# Patient Record
Sex: MALE | Race: WHITE | NOT HISPANIC OR LATINO | Employment: FULL TIME | URBAN - METROPOLITAN AREA
[De-identification: names, ages, dates, MRNs, and addresses within clinical notes are randomized per-mention and may not be internally consistent; named-entity substitution may affect disease eponyms.]

---

## 2017-01-26 ENCOUNTER — DOCUMENTATION ONLY (OUTPATIENT)
Dept: LAB | Facility: CLINIC | Age: 47
End: 2017-01-26

## 2017-01-26 DIAGNOSIS — R53.83 OTHER FATIGUE: Primary | ICD-10-CM

## 2017-01-26 NOTE — PROGRESS NOTES
Patient is coming in for labs on 2/2/17. Physical labs have already been put as future, however, patient is also requesting to have testosterone checked at the same time. If testing is needed, please place order as future.    Thank you.

## 2017-02-02 DIAGNOSIS — Z00.00 ENCOUNTER FOR ROUTINE ADULT HEALTH EXAMINATION WITHOUT ABNORMAL FINDINGS: ICD-10-CM

## 2017-02-02 DIAGNOSIS — E78.5 HYPERLIPIDEMIA LDL GOAL <160: ICD-10-CM

## 2017-02-02 DIAGNOSIS — R53.83 OTHER FATIGUE: ICD-10-CM

## 2017-02-02 LAB
ALBUMIN SERPL-MCNC: 4.3 G/DL (ref 3.4–5)
ALP SERPL-CCNC: 72 U/L (ref 40–150)
ALT SERPL W P-5'-P-CCNC: 47 U/L (ref 0–70)
ANION GAP SERPL CALCULATED.3IONS-SCNC: 7 MMOL/L (ref 3–14)
AST SERPL W P-5'-P-CCNC: 18 U/L (ref 0–45)
BILIRUB SERPL-MCNC: 0.7 MG/DL (ref 0.2–1.3)
BUN SERPL-MCNC: 13 MG/DL (ref 7–30)
CALCIUM SERPL-MCNC: 9.4 MG/DL (ref 8.5–10.1)
CHLORIDE SERPL-SCNC: 103 MMOL/L (ref 94–109)
CHOLEST SERPL-MCNC: 215 MG/DL
CO2 SERPL-SCNC: 29 MMOL/L (ref 20–32)
CREAT SERPL-MCNC: 0.93 MG/DL (ref 0.66–1.25)
ERYTHROCYTE [DISTWIDTH] IN BLOOD BY AUTOMATED COUNT: 12.8 % (ref 10–15)
GFR SERPL CREATININE-BSD FRML MDRD: 88 ML/MIN/1.7M2
GLUCOSE SERPL-MCNC: 104 MG/DL (ref 70–99)
HCT VFR BLD AUTO: 44.9 % (ref 40–53)
HDLC SERPL-MCNC: 56 MG/DL
HGB BLD-MCNC: 15.5 G/DL (ref 13.3–17.7)
LDLC SERPL CALC-MCNC: 130 MG/DL
MCH RBC QN AUTO: 29.1 PG (ref 26.5–33)
MCHC RBC AUTO-ENTMCNC: 34.5 G/DL (ref 31.5–36.5)
MCV RBC AUTO: 84 FL (ref 78–100)
NONHDLC SERPL-MCNC: 159 MG/DL
PLATELET # BLD AUTO: 158 10E9/L (ref 150–450)
POTASSIUM SERPL-SCNC: 4.1 MMOL/L (ref 3.4–5.3)
PROT SERPL-MCNC: 7.3 G/DL (ref 6.8–8.8)
RBC # BLD AUTO: 5.33 10E12/L (ref 4.4–5.9)
SODIUM SERPL-SCNC: 139 MMOL/L (ref 133–144)
TRIGL SERPL-MCNC: 146 MG/DL
WBC # BLD AUTO: 5.8 10E9/L (ref 4–11)

## 2017-02-02 PROCEDURE — 80053 COMPREHEN METABOLIC PANEL: CPT | Performed by: PHYSICIAN ASSISTANT

## 2017-02-02 PROCEDURE — 85027 COMPLETE CBC AUTOMATED: CPT | Performed by: PHYSICIAN ASSISTANT

## 2017-02-02 PROCEDURE — 84403 ASSAY OF TOTAL TESTOSTERONE: CPT | Performed by: PHYSICIAN ASSISTANT

## 2017-02-02 PROCEDURE — 80061 LIPID PANEL: CPT | Performed by: PHYSICIAN ASSISTANT

## 2017-02-02 PROCEDURE — 36415 COLL VENOUS BLD VENIPUNCTURE: CPT | Performed by: PHYSICIAN ASSISTANT

## 2017-02-04 LAB — TESTOST SERPL-MCNC: 456 NG/DL (ref 240–950)

## 2017-02-09 ENCOUNTER — OFFICE VISIT (OUTPATIENT)
Dept: FAMILY MEDICINE | Facility: CLINIC | Age: 47
End: 2017-02-09
Payer: COMMERCIAL

## 2017-02-09 VITALS
RESPIRATION RATE: 16 BRPM | DIASTOLIC BLOOD PRESSURE: 84 MMHG | HEIGHT: 75 IN | HEART RATE: 93 BPM | BODY MASS INDEX: 28.97 KG/M2 | WEIGHT: 233 LBS | OXYGEN SATURATION: 100 % | SYSTOLIC BLOOD PRESSURE: 110 MMHG | TEMPERATURE: 97.1 F

## 2017-02-09 DIAGNOSIS — J45.30 MILD PERSISTENT ASTHMA WITHOUT COMPLICATION: ICD-10-CM

## 2017-02-09 DIAGNOSIS — J30.1 ALLERGIC RHINITIS DUE TO POLLEN, UNSPECIFIED RHINITIS SEASONALITY: ICD-10-CM

## 2017-02-09 DIAGNOSIS — Z23 NEED FOR PROPHYLACTIC VACCINATION AND INOCULATION AGAINST INFLUENZA: ICD-10-CM

## 2017-02-09 DIAGNOSIS — Z00.00 ROUTINE ADULT HEALTH MAINTENANCE: Primary | ICD-10-CM

## 2017-02-09 DIAGNOSIS — J31.0 CHRONIC RHINITIS: ICD-10-CM

## 2017-02-09 DIAGNOSIS — G25.81 RESTLESS LEGS SYNDROME (RLS): ICD-10-CM

## 2017-02-09 DIAGNOSIS — I83.93 VARICOSE VEINS OF BOTH LOWER EXTREMITIES: ICD-10-CM

## 2017-02-09 PROCEDURE — 99396 PREV VISIT EST AGE 40-64: CPT | Mod: 25 | Performed by: PHYSICIAN ASSISTANT

## 2017-02-09 PROCEDURE — 90686 IIV4 VACC NO PRSV 0.5 ML IM: CPT | Performed by: PHYSICIAN ASSISTANT

## 2017-02-09 PROCEDURE — 90471 IMMUNIZATION ADMIN: CPT | Performed by: PHYSICIAN ASSISTANT

## 2017-02-09 RX ORDER — FLUTICASONE PROPIONATE 50 MCG
2 SPRAY, SUSPENSION (ML) NASAL DAILY
Qty: 16 G | Refills: 3 | Status: SHIPPED | OUTPATIENT
Start: 2017-02-09 | End: 2018-03-15

## 2017-02-09 RX ORDER — ALBUTEROL SULFATE 90 UG/1
2 AEROSOL, METERED RESPIRATORY (INHALATION) EVERY 6 HOURS PRN
Qty: 3 INHALER | Refills: 3 | Status: SHIPPED | OUTPATIENT
Start: 2017-02-09 | End: 2019-03-05

## 2017-02-09 RX ORDER — ROPINIROLE 0.5 MG/1
0.5 TABLET, FILM COATED ORAL AT BEDTIME
Qty: 30 TABLET | Refills: 3 | Status: SHIPPED | OUTPATIENT
Start: 2017-02-09 | End: 2018-03-15

## 2017-02-09 NOTE — NURSING NOTE
Injectable Influenza Immunization Documentation    1.  Is the person to be vaccinated sick today?  No    2. Does the person to be vaccinated have an allergy to eggs or to a component of the vaccine?  No    3. Has the person to be vaccinated today ever had a serious reaction to influenza vaccine in the past?  No    4. Has the person to be vaccinated ever had Guillain-[place]enspark syndrome?  No     Form completed verbally with patient. Maricarmen GOYAL MA

## 2017-02-09 NOTE — PROGRESS NOTES
"  SUBJECTIVE:     CC: Bobby Alexandre is an 46 year old male who presents for preventative health visit.     He has lost weight working out and watching his diet.  Has asthma which is well controlled and states he \"never\" needs to use his rescue inhaler  Has sx of RLS more freq lately and would like to try something like this.  Has some VV bilat LE. Denies any pain or swelling in the legs but doesn't like these.  Travels freq for work.    Healthy Habits:    Do you get at least three servings of calcium containing foods daily (dairy, green leafy vegetables, etc.)? yes    Amount of exercise or daily activities, outside of work: 304 day(s) per week    Problems taking medications regularly No    Medication side effects: No    Have you had an eye exam in the past two years? yes    Do you see a dentist twice per year? yes    Do you have sleep apnea, excessive snoring or daytime drowsiness?no      Today's PHQ-2 Score:   PHQ-2 ( 1999 Pfizer) 2/18/2016 1/27/2016   Q1: Little interest or pleasure in doing things 0 0   Q2: Feeling down, depressed or hopeless 0 0   PHQ-2 Score 0 0         Social History   Substance Use Topics     Smoking status: Never Smoker      Smokeless tobacco: Never Used     Alcohol Use: 0.0 oz/week     0 Standard drinks or equivalent per week      Comment: 1 drink per month         Last PSA: No results found for: PSA    Recent Labs   Lab Test  02/02/17   0813  09/29/14   0841  10/11/13   0810   CHOL  215*  220*  231*   HDL  56  55  51   LDL  130*  116  146*   TRIG  146  247*  168*   CHOLHDLRATIO   --   4.0  4.5   NHDL  159*   --    --      All Histories reviewed and updated in Epic.  Past Medical History   Diagnosis Date     Mild persistent asthma      Basal cell carcinoma, scalp/neck 2015     s/p mohs      Past Surgical History   Procedure Laterality Date     Tonsillectomy         ROS:  C: NEGATIVE for fever, chills, change in weight  I: NEGATIVE for worrisome rashes, moles or lesions  E: NEGATIVE " "for vision changes or irritation  ENT: NEGATIVE for ear, mouth and throat problems  R: NEGATIVE for significant cough or SOB  CV: NEGATIVE for chest pain, palpitations or peripheral edema  GI: NEGATIVE for nausea, abdominal pain, heartburn, or change in bowel habits   male: negative for dysuria, hematuria, decreased urinary stream, erectile dysfunction, urethral discharge  M: NEGATIVE for significant arthralgias or myalgia  N: NEGATIVE for weakness, dizziness or paresthesias  P: NEGATIVE for changes in mood or affect      OBJECTIVE:     /84 mmHg  Pulse 93  Temp(Src) 97.1  F (36.2  C) (Oral)  Resp 16  Ht 6' 3\" (1.905 m)  Wt 233 lb (105.688 kg)  BMI 29.12 kg/m2  SpO2 100%  EXAM:  GENERAL: healthy, alert and no distress  RESP: lungs clear to auscultation - no rales, rhonchi or wheezes  CV: regular rate and rhythm, normal S1 S2, no S3 or S4, no murmur, click or rub, no peripheral edema and peripheral pulses strong  SKIN: no suspicious lesions or rashes  NEURO: Normal strength and tone, mentation intact and speech normal  PSYCH: mentation appears normal, affect normal/bright  LE: bilat post calf VV, no edema, palp cord or erythema    Results for orders placed or performed in visit on 02/02/17   CBC with platelets   Result Value Ref Range    WBC 5.8 4.0 - 11.0 10e9/L    RBC Count 5.33 4.4 - 5.9 10e12/L    Hemoglobin 15.5 13.3 - 17.7 g/dL    Hematocrit 44.9 40.0 - 53.0 %    MCV 84 78 - 100 fl    MCH 29.1 26.5 - 33.0 pg    MCHC 34.5 31.5 - 36.5 g/dL    RDW 12.8 10.0 - 15.0 %    Platelet Count 158 150 - 450 10e9/L   Comprehensive metabolic panel   Result Value Ref Range    Sodium 139 133 - 144 mmol/L    Potassium 4.1 3.4 - 5.3 mmol/L    Chloride 103 94 - 109 mmol/L    Carbon Dioxide 29 20 - 32 mmol/L    Anion Gap 7 3 - 14 mmol/L    Glucose 104 (H) 70 - 99 mg/dL    Urea Nitrogen 13 7 - 30 mg/dL    Creatinine 0.93 0.66 - 1.25 mg/dL    GFR Estimate 88 >60 mL/min/1.7m2    GFR Estimate If Black >90  African " American GFR Calc   >60 mL/min/1.7m2    Calcium 9.4 8.5 - 10.1 mg/dL    Bilirubin Total 0.7 0.2 - 1.3 mg/dL    Albumin 4.3 3.4 - 5.0 g/dL    Protein Total 7.3 6.8 - 8.8 g/dL    Alkaline Phosphatase 72 40 - 150 U/L    ALT 47 0 - 70 U/L    AST 18 0 - 45 U/L   Lipid Profile   Result Value Ref Range    Cholesterol 215 (H) <200 mg/dL    Triglycerides 146 <150 mg/dL    HDL Cholesterol 56 >39 mg/dL    LDL Cholesterol Calculated 130 (H) <100 mg/dL    Non HDL Cholesterol 159 (H) <130 mg/dL   Testosterone, total   Result Value Ref Range    Testosterone Total 456 240 - 950 ng/dL         ASSESSMENT/PLAN:     Assessment and Plan:     (Z00.00) Routine adult health maintenance  (primary encounter diagnosis)  Comment: previsit labs reviewed with pt  Plan: medications refilled, flu vaccine given    (J45.30) Mild persistent asthma without complication  Comment:   Plan: mometasone (ASMANEX 60 METERED DOSES) 220         MCG/INH Inhaler, salmeterol (SEREVENT DISKUS)         50 MCG/DOSE diskus inhaler, albuterol (PROAIR         HFA/PROVENTIL HFA/VENTOLIN HFA) 108 (90 BASE)         MCG/ACT Inhaler        Refilled. Asthma well controlled    (J30.1) Allergic rhinitis due to pollen, unspecified rhinitis seasonality  Comment:   Plan: fluticasone (FLONASE) 50 MCG/ACT spray            (J31.0) Chronic rhinitis  Comment: uses claritin D prn and would rx  Plan: loratadine-pseudoePHEDrine (CLARITIN-D 24-HOUR)         MG per 24 hr tablet            (G25.81) Restless legs syndrome (RLS)  Comment:   Plan: rOPINIRole (REQUIP) 0.5 MG tablet        Try 1-2 tabs po qhs    (I83.93) Varicose veins of both lower extremities  Comment:   Plan: DME REFERRAL        jobst stockings tete for travel    (Z23) Need for prophylactic vaccination and inoculation against influenza  Comment:   Plan: FLU VAC, SPLIT VIRUS IM > 3 YO (QUADRIVALENT)         [07026], Vaccine Administration, Initial         [31747]                  COUNSELING:  Reviewed preventive health  "counseling, as reflected in patient instructions         reports that he has never smoked. He has never used smokeless tobacco.    Estimated body mass index is 29.12 kg/(m^2) as calculated from the following:    Height as of this encounter: 6' 3\" (1.905 m).    Weight as of this encounter: 233 lb (105.688 kg).   Weight management plan: discussed    Counseling Resources:  ATP IV Guidelines  Pooled Cohorts Equation Calculator  FRAX Risk Assessment  ICSI Preventive Guidelines  Dietary Guidelines for Americans, 2010  USDA's MyPlate  ASA Prophylaxis  Lung CA Screening    Silvina Harvey PA-C  [facility]  "

## 2017-02-09 NOTE — NURSING NOTE
"Chief Complaint   Patient presents with     Physical       Initial /84 mmHg  Pulse 93  Temp(Src) 97.1  F (36.2  C) (Oral)  Resp 16  Ht 6' 3\" (1.905 m)  Wt 233 lb (105.688 kg)  BMI 29.12 kg/m2  SpO2 100% Estimated body mass index is 29.12 kg/(m^2) as calculated from the following:    Height as of this encounter: 6' 3\" (1.905 m).    Weight as of this encounter: 233 lb (105.688 kg).  Medication Reconciliation: complete   donnie duque    "

## 2017-02-09 NOTE — MR AVS SNAPSHOT
After Visit Summary   2/9/2017    Bobby Alexandre    MRN: [id number]           Patient Information     Date Of Birth          1970        Visit Information        Provider Department      2/9/2017 4:00 PM Silvina Harvey PA-C [facility]        Today's Diagnoses     Mild persistent asthma without complication    -  1     Allergic rhinitis due to pollen, unspecified rhinitis seasonality         Chronic rhinitis         Restless legs syndrome (RLS)         Varicose veins of both lower extremities           Care Instructions      Preventive Health Recommendations  Male Ages 40 to 49    Yearly exam:             See your health care provider every year in order to  o   Review health changes.   o   Discuss preventive care.    o   Review your medicines if your doctor has prescribed any.    You should be tested each year for STDs (sexually transmitted diseases) if you re at risk.     Have a cholesterol test every 5 years.     Have a colonoscopy (test for colon cancer) if someone in your family has had colon cancer or polyps before age 50.     After age 45, have a diabetes test (fasting glucose). If you are at risk for diabetes, you should have this test every 3 years.      Talk with your health care provider about whether or not a prostate cancer screening test (PSA) is right for you.    Shots: Get a flu shot each year. Get a tetanus shot every 10 years.     Nutrition:    Eat at least 5 servings of fruits and vegetables daily.     Eat whole-grain bread, whole-wheat pasta and brown rice instead of white grains and rice.     Talk to your provider about Calcium and Vitamin D.     Lifestyle    Exercise for at least 150 minutes a week (30 minutes a day, 5 days a week). This will help you control your weight and prevent disease.     Limit alcohol to one drink per day.     No smoking.     Wear sunscreen to prevent skin cancer.     See your dentist every six months for an exam and cleaning.     "        Follow-ups after your visit        Additional Services     DME REFERRAL       Please be aware that coverage of these services is subject to the terms and limitations of your health insurance plan.  Call member services at your health plan with any benefit or coverage questions.      Dispense 2 pair medium compression knee high jobst stockings  Dx: bilat LE Varicose veins    Please bring the following to your appointment:  Any x-rays, CTs or MRIs which have been performed.  Contact the facility where they were done to arrange for  prior to your scheduled appointment.    List of current medications   This referral request   Any documents/labs given to you for this referral                  Who to contact     If you have questions or need follow up information about today's clinic visit or your schedule please contact [facility] directly at [phone].  Normal or non-critical lab and imaging results will be communicated to you by MyChart, letter or phone within 4 business days after the clinic has received the results. If you do not hear from us within 7 days, please contact the clinic through MyChart or phone. If you have a critical or abnormal lab result, we will notify you by phone as soon as possible.  Submit refill requests through [employer] or call your pharmacy and they will forward the refill request to us. Please allow 3 business days for your refill to be completed.          Additional Information About Your Visit        [employer] Information     [employer] lets you send messages to your doctor, view your test results, renew your prescriptions, schedule appointments and more. To sign up, go to [email or web address] . Click on \"Log in\" on the left side of the screen, which will take you to the Welcome page. Then click on \"Sign up Now\" on the right side of the page.     You will be asked to enter the access code listed below, as well as some personal information. Please follow the " "directions to create your username and password.     Your access code is: [email or web address]  Expires: 5/10/2017  4:47 PM     Your access code will  in 90 days. If you need help or a new code, please call your [facility] clinic or [phone].        Care EveryWhere ID     This is your Care EveryWhere ID. This could be used by other organizations to access your [facility] medical records  [id number]        Your Vitals Were     Pulse Temperature Respirations Height BMI (Body Mass Index) Pulse Oximetry    93 97.1  F (36.2  C) (Oral) 16 6' 3\" (1.905 m) 29.12 kg/m2 100%       Blood Pressure from Last 3 Encounters:   17 110/84   16 125/84   16 141/92    Weight from Last 3 Encounters:   17 233 lb (105.688 kg)   16 245 lb 4.8 oz (111.267 kg)   16 243 lb (110.224 kg)              We Performed the Following     DME REFERRAL          Today's Medication Changes          These changes are accurate as of: 17  4:47 PM.  If you have any questions, ask your nurse or doctor.               Start taking these medicines.        Dose/Directions    rOPINIRole 0.5 MG tablet   Commonly known as:  REQUIP   Used for:  Restless legs syndrome (RLS)   Started by:  Silvina Harvey PA-C        Dose:  0.5 mg   Take 1 tablet (0.5 mg) by mouth At Bedtime   Quantity:  30 tablet   Refills:  3         These medicines have changed or have updated prescriptions.        Dose/Directions    mometasone 220 MCG/INH Inhaler   Commonly known as:  ASMANEX 60 METERED DOSES   This may have changed:  See the new instructions.   Used for:  Mild persistent asthma without complication   Changed by:  Silvina Harvey PA-C        INHALE 1 PUFF INTO THE LUNGS TWICE DAILY   Quantity:  3 Inhaler   Refills:  3       salmeterol 50 MCG/DOSE diskus inhaler   Commonly known as:  SEREVENT DISKUS   This may have changed:  See the new instructions.   Used for:  Mild persistent asthma without complication   Changed by:  Silvina Harvey" DARRIAN        INHALE 1 PUFF INTO THE LUNGS TWICE DAILY   Quantity:  180 Inhaler   Refills:  3            Where to get your medicines      These medications were sent to [id number] - [place], [place] - [address] AT [address] [place] [address] & [facility]  [address] [place] [address]    Hours:  24-hours Phone:  [phone]    - albuterol 108 (90 BASE) MCG/ACT Inhaler  - fluticasone 50 MCG/ACT spray  - mometasone 220 MCG/INH Inhaler  - rOPINIRole 0.5 MG tablet  - salmeterol 50 MCG/DOSE diskus inhaler      Some of these will need a paper prescription and others can be bought over the counter.  Ask your nurse if you have questions.     Bring a paper prescription for each of these medications    - loratadine-pseudoePHEDrine  MG per 24 hr tablet             Primary Care Provider Office Phone # Fax #    Silvina Kristina Harvey PA-C [phone]       [facility] [address]  [facility] [address]        Thank you!     Thank you for choosing [facility]  for your care. Our goal is always to provide you with excellent care. Hearing back from our patients is one way we can continue to improve our services. Please take a few minutes to complete the written survey that you may receive in the mail after your visit with us. Thank you!             Your Updated Medication List - Protect others around you: Learn how to safely use, store and throw away your medicines at www.disposemymeds.org.          This list is accurate as of: 2/9/17  4:47 PM.  Always use your most recent med list.                   Brand Name Dispense Instructions for use    albuterol 108 (90 BASE) MCG/ACT Inhaler    PROAIR HFA/PROVENTIL HFA/VENTOLIN HFA    3 Inhaler    Inhale 2 puffs into the lungs every 6 hours as needed for shortness of breath / dyspnea       fluticasone 50 MCG/ACT spray    FLONASE    16 g    Spray 2 sprays into both nostrils daily       loratadine-pseudoePHEDrine  MG per 24 hr tablet     CLARITIN-D 24-hour    30 tablet    Take 1 tablet by mouth daily as needed       mometasone 220 MCG/INH Inhaler    ASMANEX 60 METERED DOSES    3 Inhaler    INHALE 1 PUFF INTO THE LUNGS TWICE DAILY       rOPINIRole 0.5 MG tablet    REQUIP    30 tablet    Take 1 tablet (0.5 mg) by mouth At Bedtime       salmeterol 50 MCG/DOSE diskus inhaler    SEREVENT DISKUS    180 Inhaler    INHALE 1 PUFF INTO THE LUNGS TWICE DAILY       triamcinolone 0.1 % cream    KENALOG    30 g    Apply sparingly to affected area three times daily for 14 days.       vitamin D 2000 UNITS tablet      Take 1 tablet by mouth daily

## 2017-02-10 ASSESSMENT — ASTHMA QUESTIONNAIRES: ACT_TOTALSCORE: 25

## 2017-07-24 DIAGNOSIS — J45.30 MILD PERSISTENT ASTHMA: ICD-10-CM

## 2017-07-25 NOTE — TELEPHONE ENCOUNTER
Has patient really gone thru 12 inhalers since Feb 2017?        On 2-9-17 we ok'ed 3 inhalers with 3 adiditonal refills at [facility] [address] .     Last office visit 2/2017       mometasone (ASMANEX 60 METERED DOSES) 220 MCG/INH Inhaler 3 Inhaler 3 2/9/2017  No   Sig: INHALE 1 PUFF INTO THE LUNGS TWICE DAILY     Associated Diagnoses   Mild persistent asthma without complication [J45.30]           Date of Last Asthma Action Plan Letter:   Asthma Action Plan Q1 Year    Topic Date Due     Asthma Action Plan - yearly  12/16/2016      Asthma Control Test:   ACT Total Scores 2/9/2017   ACT TOTAL SCORE -   ASTHMA ER VISITS -   ASTHMA HOSPITALIZATIONS -   ACT TOTAL SCORE (Goal Greater than or Equal to 20) 25   In the past 12 months, how many times did you visit the emergency room for your asthma without being admitted to the hospital? 0   In the past 12 months, how many times were you hospitalized overnight because of your asthma? 0       Date of Last Spirometry Test:   No results found for this or any previous visit.

## 2017-07-26 DIAGNOSIS — J45.30 MILD PERSISTENT ASTHMA WITHOUT COMPLICATION: ICD-10-CM

## 2017-07-27 NOTE — TELEPHONE ENCOUNTER
Spoke with Nancy - states they received an Rx 7/27/17 and to disregard request  Fatoumata BUTLER RN

## 2018-01-05 ENCOUNTER — OFFICE VISIT (OUTPATIENT)
Dept: URGENT CARE | Facility: URGENT CARE | Age: 48
End: 2018-01-05
Payer: COMMERCIAL

## 2018-01-05 VITALS
DIASTOLIC BLOOD PRESSURE: 93 MMHG | RESPIRATION RATE: 17 BRPM | OXYGEN SATURATION: 97 % | SYSTOLIC BLOOD PRESSURE: 123 MMHG | HEIGHT: 75 IN | BODY MASS INDEX: 30.46 KG/M2 | HEART RATE: 110 BPM | TEMPERATURE: 96.4 F | WEIGHT: 245 LBS

## 2018-01-05 DIAGNOSIS — J98.01 ACUTE BRONCHOSPASM: Primary | ICD-10-CM

## 2018-01-05 DIAGNOSIS — J20.9 ACUTE BRONCHITIS WITH SYMPTOMS > 10 DAYS: ICD-10-CM

## 2018-01-05 PROCEDURE — 99214 OFFICE O/P EST MOD 30 MIN: CPT

## 2018-01-05 RX ORDER — CODEINE PHOSPHATE AND GUAIFENESIN 10; 100 MG/5ML; MG/5ML
2 SOLUTION ORAL EVERY 4 HOURS PRN
Qty: 180 ML | Refills: 0 | Status: SHIPPED | OUTPATIENT
Start: 2018-01-05 | End: 2018-03-15

## 2018-01-05 RX ORDER — DOXYCYCLINE 100 MG/1
100 CAPSULE ORAL 2 TIMES DAILY
Qty: 20 CAPSULE | Refills: 0 | Status: SHIPPED | OUTPATIENT
Start: 2018-01-05 | End: 2018-03-15

## 2018-01-05 RX ORDER — ALBUTEROL SULFATE 0.83 MG/ML
1 SOLUTION RESPIRATORY (INHALATION) ONCE
Qty: 3 ML | Refills: 0 | Status: SHIPPED | OUTPATIENT
Start: 2018-01-05 | End: 2018-12-18

## 2018-01-05 RX ORDER — PREDNISONE 20 MG/1
40 TABLET ORAL DAILY
Qty: 10 TABLET | Refills: 0 | Status: SHIPPED | OUTPATIENT
Start: 2018-01-05 | End: 2018-01-10

## 2018-01-05 ASSESSMENT — ENCOUNTER SYMPTOMS
COUGH: 1
MUSCULOSKELETAL NEGATIVE: 1
WHEEZING: 1
SPUTUM PRODUCTION: 1
NEUROLOGICAL NEGATIVE: 1
CARDIOVASCULAR NEGATIVE: 1
FEVER: 0
EYES NEGATIVE: 1
GASTROINTESTINAL NEGATIVE: 1
CHILLS: 1

## 2018-01-05 NOTE — PATIENT INSTRUCTIONS
Bronchitis with Wheezing (Viral or Bacterial: Adult)    Bronchitis is an infection of the air passages. It often occurs during a cold and is usually caused by a virus. Symptoms include cough with mucus (phlegm) and low-grade fever. This illness is contagious during the first few days and is spread through the air by coughing and sneezing, or by direct contact (touching the sick person and then touching your own eyes, nose, or mouth).  If there is a lot of inflammation, air flow is restricted. The air passages may also go into spasm, especially if you have asthma. This causes wheezing and difficulty breathing even in people who do not have asthma.  Bronchitis usually lasts 7 to 14 days. The wheezing should improve with treatment during the first week. An inhaler is often prescribed to relax the air passages and stop wheezing. Antibiotics will be prescribed if your doctor thinks there is also a secondary bacterial infection.  Home care    If symptoms are severe, rest at home for the first 2 to 3 days. When you go back to your usual activities, don't let yourself get too tired.    Do not smoke. Also avoid being exposed to secondhand smoke.    You may use over-the-counter medicine to control fever or pain, unless another medicine was prescribed. Note: If you have chronic liver or kidney disease or have ever had a stomach ulcer or gastrointestinal bleeding, talk with your healthcare provider before using these medicines. Also talk to your provider if you are taking medicine to prevent blood clots.) Aspirin should never be given to anyone younger than 18 years of age who is ill with a viral infection or fever. It may cause severe liver or brain damage.    Your appetite may be poor, so a light diet is fine. Avoid dehydration by drinking 6 to 8 glasses of fluids per day (such as water, soft drinks, sports drinks, juices, tea, or soup). Extra fluids will help loosen secretions in the nose and lungs.    Over-the-counter  cough, cold, and sore-throat medicines will not shorten the length of the illness, but they may be helpful to reduce symptoms. (Note: Do not use decongestants if you have high blood pressure.)    If you were given an inhaler, use it exactly as directed. If you need to use it more often than prescribed, your condition may be worsening. If this happens, contact your healthcare provider.    If prescribed, finish all antibiotic medicine, even if you are feeling better after only a few days.  Follow-up care  Follow up with your healthcare provider, or as advised. If you had an X-ray or ECG (electrocardiogram), a specialist will review it. You will be notified of any new findings that may affect your care.  Note: If you are age 65 or older, or if you have a chronic lung disease or condition that affects your immune system, or you smoke, talk to your healthcare provider about having a pneumococcal vaccinations and a yearly influenza vaccination (flu shot).  When to seek medical advice  Call your healthcare provider right away if any of these occur:    Fever of 100.4 F (38 C) or higher    Coughing up increasing amounts of colored sputum    Weakness, drowsiness, headache, facial pain, ear pain, or a stiff neck  Call 911, or get immediate medical care  Contact emergency services right away if any of these occur.    Coughing up blood    Worsening weakness, drowsiness, headache, or stiff neck    Increased wheezing not helped with medication, shortness of breath, or pain with breathing  Date Last Reviewed: 9/13/2015 2000-2017 [employer]. [address] [place] [address]. All rights reserved. This information is not intended as a substitute for professional medical care. Always follow your healthcare professional's instructions.        Bronchitis, Antibiotic Treatment (Adult)    Bronchitis is an infection of the air passages (bronchial tubes) in your lungs. It often occurs when you have a cold. This  illness is contagious during the first few days and is spread through the air by coughing and sneezing, or by direct contact (touching the sick person and then touching your own eyes, nose, or mouth).  Symptoms of bronchitis include cough with mucus (phlegm) and low-grade fever. Bronchitis usually lasts 7 to 14 days. Mild cases can be treated with simple home remedies. More severe infection is treated with an antibiotic.  Home care  Follow these guidelines when caring for yourself at home:    If your symptoms are severe, rest at home for the first 2 to 3 days. When you go back to your usual activities, don't let yourself get too tired.    Do not smoke. Also avoid being exposed to secondhand smoke.    You may use over-the-counter medicines to control fever or pain, unless another medicine was prescribed. (Note: If you have chronic liver or kidney disease or have ever had a stomach ulcer or gastrointestinal bleeding, talk with your healthcare provider before using these medicines. Also talk to your provider if you are taking medicine to prevent blood clots.) Aspirin should never be given to anyone younger than 18 years of age who is ill with a viral infection or fever. It may cause severe liver or brain damage.    Your appetite may be poor, so a light diet is fine. Avoid dehydration by drinking 6 to 8 glasses of fluids per day (such as water, soft drinks, sports drinks, juices, tea, or soup). Extra fluids will help loosen secretions in the nose and lungs.    Over-the-counter cough, cold, and sore-throat medicines will not shorten the length of the illness, but they may be helpful to reduce symptoms. (Note: Do not use decongestants if you have high blood pressure.)    Finish all antibiotic medicine. Do this even if you are feeling better after only a few days.  Follow-up care  Follow up with your healthcare provider, or as advised. If you had an X-ray or ECG (electrocardiogram), a specialist will review it. You will be  notified of any new findings that may affect your care.  Note: If you are age 65 or older, or if you have a chronic lung disease or condition that affects your immune system, or you smoke, talk to your healthcare provider about having pneumococcal vaccinations and a yearly influenza vaccination (flu shot).  When to seek medical advice  Call your healthcare provider right away if any of these occur:    Fever of 100.4 F (38 C) or higher    Coughing up increased amounts of colored sputum    Weakness, drowsiness, headache, facial pain, ear pain, or a stiff neck  Call 911, or get immediate medical care  Contact emergency services right away if any of these occur.    Coughing up blood    Worsening weakness, drowsiness, headache, or stiff neck    Trouble breathing, wheezing, or pain with breathing  Date Last Reviewed: 9/13/2015 2000-2017 [employer]. [address] [place] [address]. All rights reserved. This information is not intended as a substitute for professional medical care. Always follow your healthcare professional's instructions.

## 2018-01-05 NOTE — MR AVS SNAPSHOT
After Visit Summary   1/5/2018    Bobby Alexandre    MRN: [id number]           Patient Information     Date Of Birth          1970        Visit Information        Provider Department      1/5/2018 5:00 PM  URGENT CARE [facility]        Today's Diagnoses     Acute bronchospasm    -  1    Acute bronchitis with symptoms > 10 days          Care Instructions      Bronchitis with Wheezing (Viral or Bacterial: Adult)    Bronchitis is an infection of the air passages. It often occurs during a cold and is usually caused by a virus. Symptoms include cough with mucus (phlegm) and low-grade fever. This illness is contagious during the first few days and is spread through the air by coughing and sneezing, or by direct contact (touching the sick person and then touching your own eyes, nose, or mouth).  If there is a lot of inflammation, air flow is restricted. The air passages may also go into spasm, especially if you have asthma. This causes wheezing and difficulty breathing even in people who do not have asthma.  Bronchitis usually lasts 7 to 14 days. The wheezing should improve with treatment during the first week. An inhaler is often prescribed to relax the air passages and stop wheezing. Antibiotics will be prescribed if your doctor thinks there is also a secondary bacterial infection.  Home care    If symptoms are severe, rest at home for the first 2 to 3 days. When you go back to your usual activities, don't let yourself get too tired.    Do not smoke. Also avoid being exposed to secondhand smoke.    You may use over-the-counter medicine to control fever or pain, unless another medicine was prescribed. Note: If you have chronic liver or kidney disease or have ever had a stomach ulcer or gastrointestinal bleeding, talk with your healthcare provider before using these medicines. Also talk to your provider if you are taking medicine to prevent blood clots.) Aspirin should  never be given to anyone younger than 18 years of age who is ill with a viral infection or fever. It may cause severe liver or brain damage.    Your appetite may be poor, so a light diet is fine. Avoid dehydration by drinking 6 to 8 glasses of fluids per day (such as water, soft drinks, sports drinks, juices, tea, or soup). Extra fluids will help loosen secretions in the nose and lungs.    Over-the-counter cough, cold, and sore-throat medicines will not shorten the length of the illness, but they may be helpful to reduce symptoms. (Note: Do not use decongestants if you have high blood pressure.)    If you were given an inhaler, use it exactly as directed. If you need to use it more often than prescribed, your condition may be worsening. If this happens, contact your healthcare provider.    If prescribed, finish all antibiotic medicine, even if you are feeling better after only a few days.  Follow-up care  Follow up with your healthcare provider, or as advised. If you had an X-ray or ECG (electrocardiogram), a specialist will review it. You will be notified of any new findings that may affect your care.  Note: If you are age 65 or older, or if you have a chronic lung disease or condition that affects your immune system, or you smoke, talk to your healthcare provider about having a pneumococcal vaccinations and a yearly influenza vaccination (flu shot).  When to seek medical advice  Call your healthcare provider right away if any of these occur:    Fever of 100.4 F (38 C) or higher    Coughing up increasing amounts of colored sputum    Weakness, drowsiness, headache, facial pain, ear pain, or a stiff neck  Call 911, or get immediate medical care  Contact emergency services right away if any of these occur.    Coughing up blood    Worsening weakness, drowsiness, headache, or stiff neck    Increased wheezing not helped with medication, shortness of breath, or pain with breathing  Date Last Reviewed: 9/13/2015     [id number] [employer]. [address] [place] [address]. All rights reserved. This information is not intended as a substitute for professional medical care. Always follow your healthcare professional's instructions.        Bronchitis, Antibiotic Treatment (Adult)    Bronchitis is an infection of the air passages (bronchial tubes) in your lungs. It often occurs when you have a cold. This illness is contagious during the first few days and is spread through the air by coughing and sneezing, or by direct contact (touching the sick person and then touching your own eyes, nose, or mouth).  Symptoms of bronchitis include cough with mucus (phlegm) and low-grade fever. Bronchitis usually lasts 7 to 14 days. Mild cases can be treated with simple home remedies. More severe infection is treated with an antibiotic.  Home care  Follow these guidelines when caring for yourself at home:    If your symptoms are severe, rest at home for the first 2 to 3 days. When you go back to your usual activities, don't let yourself get too tired.    Do not smoke. Also avoid being exposed to secondhand smoke.    You may use over-the-counter medicines to control fever or pain, unless another medicine was prescribed. (Note: If you have chronic liver or kidney disease or have ever had a stomach ulcer or gastrointestinal bleeding, talk with your healthcare provider before using these medicines. Also talk to your provider if you are taking medicine to prevent blood clots.) Aspirin should never be given to anyone younger than 18 years of age who is ill with a viral infection or fever. It may cause severe liver or brain damage.    Your appetite may be poor, so a light diet is fine. Avoid dehydration by drinking 6 to 8 glasses of fluids per day (such as water, soft drinks, sports drinks, juices, tea, or soup). Extra fluids will help loosen secretions in the nose and lungs.    Over-the-counter cough, cold, and sore-throat  medicines will not shorten the length of the illness, but they may be helpful to reduce symptoms. (Note: Do not use decongestants if you have high blood pressure.)    Finish all antibiotic medicine. Do this even if you are feeling better after only a few days.  Follow-up care  Follow up with your healthcare provider, or as advised. If you had an X-ray or ECG (electrocardiogram), a specialist will review it. You will be notified of any new findings that may affect your care.  Note: If you are age 65 or older, or if you have a chronic lung disease or condition that affects your immune system, or you smoke, talk to your healthcare provider about having pneumococcal vaccinations and a yearly influenza vaccination (flu shot).  When to seek medical advice  Call your healthcare provider right away if any of these occur:    Fever of 100.4 F (38 C) or higher    Coughing up increased amounts of colored sputum    Weakness, drowsiness, headache, facial pain, ear pain, or a stiff neck  Call 911, or get immediate medical care  Contact emergency services right away if any of these occur.    Coughing up blood    Worsening weakness, drowsiness, headache, or stiff neck    Trouble breathing, wheezing, or pain with breathing  Date Last Reviewed: 9/13/2015 2000-2017 [employer]. [address] [place] [address], [place] [address]. All rights reserved. This information is not intended as a substitute for professional medical care. Always follow your healthcare professional's instructions.                Follow-ups after your visit        Who to contact     If you have questions or need follow up information about today's clinic visit or your schedule please contact [facility] directly at [phone].  Normal or non-critical lab and imaging results will be communicated to you by MyChart, letter or phone within 4 business days after the clinic has received the results. If you do not hear from us within 7  "days, please contact the clinic through [employer] or phone. If you have a critical or abnormal lab result, we will notify you by phone as soon as possible.  Submit refill requests through [employer] or call your pharmacy and they will forward the refill request to us. Please allow 3 business days for your refill to be completed.          Additional Information About Your Visit        [employer] Information     [employer] lets you send messages to your doctor, view your test results, renew your prescriptions, schedule appointments and more. To sign up, go to [email or web address] . Click on \"Log in\" on the left side of the screen, which will take you to the Welcome page. Then click on \"Sign up Now\" on the right side of the page.     You will be asked to enter the access code listed below, as well as some personal information. Please follow the directions to create your username and password.     Your access code is: [email or web address]  Expires: 2018  5:40 PM     Your access code will  in 90 days. If you need help or a new code, please call your [place] clinic or [phone].        Care EveryWhere ID     This is your Care EveryWhere ID. This could be used by other organizations to access your [place] medical records  [id number]        Your Vitals Were     Pulse Temperature Respirations Height Pulse Oximetry BMI (Body Mass Index)    110 96.4  F (35.8  C) (Oral) 17 6' 3\" (1.905 m) 97% 30.62 kg/m2       Blood Pressure from Last 3 Encounters:   18 (!) 123/93   17 110/84   16 125/84    Weight from Last 3 Encounters:   18 245 lb (111.1 kg)   17 233 lb (105.7 kg)   16 245 lb 4.8 oz (111.3 kg)              Today, you had the following     No orders found for display         Today's Medication Changes          These changes are accurate as of: 18  5:40 PM.  If you have any questions, ask your nurse or doctor.               Start taking these medicines.        Dose/Directions    " doxycycline 100 MG capsule   Commonly known as:  VIBRAMYCIN   Used for:  Acute bronchitis with symptoms > 10 days        Dose:  100 mg   Take 1 capsule (100 mg) by mouth 2 times daily   Quantity:  20 capsule   Refills:  0       guaiFENesin-codeine 100-10 MG/5ML Soln solution   Commonly known as:  ROBITUSSIN AC   Used for:  Acute bronchitis with symptoms > 10 days        Dose:  2 tsp.   Take 10 mLs by mouth every 4 hours as needed for cough   Quantity:  180 mL   Refills:  0         These medicines have changed or have updated prescriptions.        Dose/Directions    * albuterol 108 (90 BASE) MCG/ACT Inhaler   Commonly known as:  PROAIR HFA/PROVENTIL HFA/VENTOLIN HFA   This may have changed:  Another medication with the same name was added. Make sure you understand how and when to take each.   Used for:  Mild persistent asthma without complication   Changed by:  Silvina Harvey PA-C        Dose:  2 puff   Inhale 2 puffs into the lungs every 6 hours as needed for shortness of breath / dyspnea   Quantity:  3 Inhaler   Refills:  3       * albuterol (2.5 MG/3ML) 0.083% neb solution   This may have changed:  You were already taking a medication with the same name, and this prescription was added. Make sure you understand how and when to take each.   Used for:  Acute bronchospasm        Dose:  1 vial   Take 1 vial (2.5 mg) by nebulization once for 1 dose   Quantity:  3 mL   Refills:  0       * Notice:  This list has 2 medication(s) that are the same as other medications prescribed for you. Read the directions carefully, and ask your doctor or other care provider to review them with you.         Where to get your medicines      These medications were sent to [facility] [id number] [place] [address] [address] [address]    Hours:  24-hours Phone:  [phone]     doxycycline 100 MG capsule         Some of these will need a paper prescription and others can be  bought over the counter.  Ask your nurse if you have questions.     Bring a paper prescription for each of these medications     albuterol (2.5 MG/3ML) 0.083% neb solution    guaiFENesin-codeine 100-10 MG/5ML Soln solution                Primary Care Provider Office Phone # Fax #    Silvina Harvey PA-C [phone] [address]  [facility] [address]        Equal Access to Services     TEN BERNAL : Hadii aad ku hadasho Soomaali, waaxda luqadaha, qaybta kaalmada adeegyada, waxay idiin hayaan adeeg kharash la'yamini . So [facility] [phone].    ATENCIÓN: Si habla español, tiene a mendoza disposición servicios gratuitos de asistencia lingüística. [facility] [phone].    We comply with applicable federal civil rights laws and [place] laws. We do not discriminate on the basis of race, color, national origin, age, disability, sex, sexual orientation, or gender identity.            Thank you!     Thank you for choosing [facility]  for your care. Our goal is always to provide you with excellent care. Hearing back from our patients is one way we can continue to improve our services. Please take a few minutes to complete the written survey that you may receive in the mail after your visit with us. Thank you!             Your Updated Medication List - Protect others around you: Learn how to safely use, store and throw away your medicines at www.disposemymeds.org.          This list is accurate as of: 1/5/18  5:40 PM.  Always use your most recent med list.                   Brand Name Dispense Instructions for use Diagnosis    * albuterol 108 (90 BASE) MCG/ACT Inhaler    PROAIR HFA/PROVENTIL HFA/VENTOLIN HFA    3 Inhaler    Inhale 2 puffs into the lungs every 6 hours as needed for shortness of breath / dyspnea    Mild persistent asthma without complication       * albuterol (2.5 MG/3ML) 0.083% neb solution     3 mL    Take 1 vial (2.5 mg) by nebulization once for 1 dose    Acute  bronchospasm       doxycycline 100 MG capsule    VIBRAMYCIN    20 capsule    Take 1 capsule (100 mg) by mouth 2 times daily    Acute bronchitis with symptoms > 10 days       fluticasone 50 MCG/ACT spray    FLONASE    16 g    Spray 2 sprays into both nostrils daily    Allergic rhinitis due to pollen, unspecified rhinitis seasonality       guaiFENesin-codeine 100-10 MG/5ML Soln solution    ROBITUSSIN AC    180 mL    Take 10 mLs by mouth every 4 hours as needed for cough    Acute bronchitis with symptoms > 10 days       loratadine-pseudoePHEDrine  MG per 24 hr tablet    CLARITIN-D 24-hour    30 tablet    Take 1 tablet by mouth daily as needed    Chronic rhinitis       mometasone 220 MCG/INH Inhaler    ASMANEX 60 METERED DOSES    3 Inhaler    INHALE 1 PUFF INTO THE LUNGS TWICE DAILY    Mild persistent asthma without complication       rOPINIRole 0.5 MG tablet    REQUIP    30 tablet    Take 1 tablet (0.5 mg) by mouth At Bedtime    Restless legs syndrome (RLS)       salmeterol 50 MCG/DOSE diskus inhaler    SEREVENT DISKUS    180 Inhaler    INHALE 1 PUFF INTO THE LUNGS TWICE DAILY    Mild persistent asthma without complication       triamcinolone 0.1 % cream    KENALOG    30 g    Apply sparingly to affected area three times daily for 14 days.    Tinea cruris       vitamin D 2000 UNITS tablet      Take 1 tablet by mouth daily        * Notice:  This list has 2 medication(s) that are the same as other medications prescribed for you. Read the directions carefully, and ask your doctor or other care provider to review them with you.

## 2018-01-06 NOTE — PROGRESS NOTES
HPI  Bobby Alexandre 47 year old presents with a URI for 4 weeks and a change in cough and chest congestion over the past 4-5 days. Cough has become productive, painful and he notes a bit of wheezing which is unusual as his asthma symptoms have been well controlled. Negative N/V/D/F/C chest pain or SOB.     Past Medical History:   Diagnosis Date     Basal cell carcinoma, scalp/neck 2015    s/p mohs     Mild persistent asthma      Past Surgical History:   Procedure Laterality Date     TONSILLECTOMY       Patient Active Problem List   Diagnosis     Mild persistent asthma     Hyperlipidemia LDL goal <160     Basal cell carcinoma, scalp/neck     Restless legs syndrome (RLS)     Allergies   Allergen Reactions     No Known Allergies      Current Outpatient Prescriptions   Medication     albuterol (2.5 MG/3ML) 0.083% neb solution     doxycycline (VIBRAMYCIN) 100 MG capsule     guaiFENesin-codeine (ROBITUSSIN AC) 100-10 MG/5ML SOLN solution     mometasone (ASMANEX 60 METERED DOSES) 220 MCG/INH Inhaler     salmeterol (SEREVENT DISKUS) 50 MCG/DOSE diskus inhaler     albuterol (PROAIR HFA/PROVENTIL HFA/VENTOLIN HFA) 108 (90 BASE) MCG/ACT Inhaler     fluticasone (FLONASE) 50 MCG/ACT spray     loratadine-pseudoePHEDrine (CLARITIN-D 24-HOUR)  MG per 24 hr tablet     rOPINIRole (REQUIP) 0.5 MG tablet     triamcinolone (KENALOG) 0.1 % cream     Cholecalciferol (VITAMIN D) 2000 UNITS tablet     No current facility-administered medications for this visit.          Review of Systems   Constitutional: Positive for chills and malaise/fatigue. Negative for fever.   HENT: Negative.    Eyes: Negative.    Respiratory: Positive for cough, sputum production and wheezing.    Cardiovascular: Negative.    Gastrointestinal: Negative.    Musculoskeletal: Negative.    Skin: Negative.    Neurological: Negative.    Endo/Heme/Allergies: Negative.          Physical Exam   Constitutional: He is well-developed, well-nourished, and in no  "distress. No distress.   BP (!) 123/93  Pulse 110  Temp 96.4  F (35.8  C) (Oral)  Resp 17  Ht 6' 3\" (1.905 m)  Wt 245 lb (111.1 kg)  SpO2 97%  BMI 30.62 kg/m2.    HENT:   Head: Normocephalic.   Right Ear: External ear normal.   Left Ear: External ear normal.   Nose: Nose normal.   Mouth/Throat: Oropharynx is clear and moist. No oropharyngeal exudate.   Eyes: Conjunctivae are normal.   Cardiovascular: Normal rate, regular rhythm and normal heart sounds.    Pulmonary/Chest: Effort normal. He has wheezes.   Wheezing mild bilaterally. Clears with single albuterol neb (2.5 mg).    Abdominal: There is no tenderness.   Lymphadenopathy:     He has cervical adenopathy.   Neurological: He is alert.   Skin: Skin is warm and dry.   Nursing note and vitals reviewed.    Assessment:  1. Acute bronchospasm    2. Acute bronchitis with symptoms > 10 days        Assessment:  1. Acute bronchospasm    2. Acute bronchitis with symptoms > 10 days        Plan:  Orders Placed This Encounter     albuterol (2.5 MG/3ML) 0.083% neb solution     doxycycline (VIBRAMYCIN) 100 MG capsule     guaiFENesin-codeine (ROBITUSSIN AC) 100-10 MG/5ML SOLN solution     predniSONE (DELTASONE) 20 MG tablet     Tylenol 1-2 tabs po q4h prn  Fluids, rest, monitor closely for increased asthma sx  Instructions regarding self-care of patient/child reviewed.   Written instructions provided in after visit summary and reviewed.  Patient instructed to see primary care provider for new or persistent symptoms.   Red flag symptoms reviewed and patient has been instructed to seek emergent   Care at the closest emergency room for evaluation and treatment.   Please contact pharmacy for medication questions.  Patient instructed to take medications as directed on package.        Allyn Gil, APRN, CNP        "

## 2018-03-15 ENCOUNTER — OFFICE VISIT (OUTPATIENT)
Dept: FAMILY MEDICINE | Facility: CLINIC | Age: 48
End: 2018-03-15
Payer: COMMERCIAL

## 2018-03-15 VITALS
SYSTOLIC BLOOD PRESSURE: 126 MMHG | HEART RATE: 93 BPM | WEIGHT: 241 LBS | DIASTOLIC BLOOD PRESSURE: 87 MMHG | OXYGEN SATURATION: 98 % | BODY MASS INDEX: 29.97 KG/M2 | TEMPERATURE: 97.6 F | HEIGHT: 75 IN

## 2018-03-15 DIAGNOSIS — E78.5 HYPERLIPIDEMIA LDL GOAL <160: ICD-10-CM

## 2018-03-15 DIAGNOSIS — R25.2 CRAMP OF LIMB: ICD-10-CM

## 2018-03-15 DIAGNOSIS — R00.2 PALPITATIONS: ICD-10-CM

## 2018-03-15 DIAGNOSIS — R73.9 HYPERGLYCEMIA: ICD-10-CM

## 2018-03-15 DIAGNOSIS — J45.30 MILD PERSISTENT ASTHMA WITHOUT COMPLICATION: Primary | ICD-10-CM

## 2018-03-15 DIAGNOSIS — Z13.6 SCREENING FOR HEART DISEASE: ICD-10-CM

## 2018-03-15 PROCEDURE — 99214 OFFICE O/P EST MOD 30 MIN: CPT | Performed by: PHYSICIAN ASSISTANT

## 2018-03-15 NOTE — PROGRESS NOTES
HPI: 48 yo male with asthma here for for f/u  He was sick with multiple URIs this year  Has more stress at work  Was tx with prednisone and doxycycline on 1/5/18 and did get better  Also had stomach bug with vomiting and extreme fatigue but that also resolved  He started to take MVI, vit D and Mg and K (for foot cramps)  His cramps are worse when on a low carb diet  Also has some heart palpit when low carb  He didn't get a flu shot this year.  He did gain some weight so trying to eat better which is working  He is starting to exercise again in his home gym  Caffeine: drinks a lot of coffee (1-2/d) and diet coke at lunch    Past Medical History:   Diagnosis Date     Basal cell carcinoma, scalp/neck 2015    s/p mohs     Mild persistent asthma      Past Surgical History:   Procedure Laterality Date     TONSILLECTOMY       Social History   Substance Use Topics     Smoking status: Never Smoker     Smokeless tobacco: Never Used     Alcohol use 0.0 oz/week     0 Standard drinks or equivalent per week      Comment: 1 drink per month     Current Outpatient Prescriptions   Medication Sig Dispense Refill     Multiple Vitamins-Minerals (MULTIVITAMIN ADULT PO)        MAGNESIUM-POTASSIUM PO        mometasone (ASMANEX 60 METERED DOSES) 220 MCG/INH Inhaler INHALE 1 PUFF INTO THE LUNGS TWICE DAILY 3 Inhaler 3     salmeterol (SEREVENT DISKUS) 50 MCG/DOSE diskus inhaler INHALE 1 PUFF INTO THE LUNGS TWICE DAILY 180 Inhaler 3     albuterol (PROAIR HFA/PROVENTIL HFA/VENTOLIN HFA) 108 (90 BASE) MCG/ACT Inhaler Inhale 2 puffs into the lungs every 6 hours as needed for shortness of breath / dyspnea 3 Inhaler 3     loratadine-pseudoePHEDrine (CLARITIN-D 24-HOUR)  MG per 24 hr tablet Take 1 tablet by mouth daily as needed 30 tablet 3     triamcinolone (KENALOG) 0.1 % cream Apply sparingly to affected area three times daily for 14 days. 30 g 0     Cholecalciferol (VITAMIN D) 2000 UNITS tablet Take 1 tablet by mouth daily        "[DISCONTINUED] mometasone (ASMANEX 60 METERED DOSES) 220 MCG/INH Inhaler INHALE 1 PUFF INTO THE LUNGS TWICE DAILY 3 Inhaler 3     [DISCONTINUED] salmeterol (SEREVENT DISKUS) 50 MCG/DOSE diskus inhaler INHALE 1 PUFF INTO THE LUNGS TWICE DAILY 180 Inhaler 3     Allergies   Allergen Reactions     No Known Allergies      FAMILY HISTORY NOTED AND REVIEWED  PHYSICAL EXAM:    /87 (BP Location: Right arm, Cuff Size: Adult Large)  Pulse 93  Temp 97.6  F (36.4  C) (Tympanic)  Ht 6' 3\" (1.905 m)  Wt 241 lb (109.3 kg)  SpO2 98%  BMI 30.12 kg/m2    Patient appears non toxic  Neck: no carotid bruits  Lungs; CTA bilat without wheezing  Heart: RRR without m/r/g.  Psych: approp affect and mood.    Assessment and Plan:     (J45.30) Mild persistent asthma without complication  (primary encounter diagnosis)  Comment: asthma stable on current regimen.  Refilled meds x 1 year.  Plan: mometasone (ASMANEX 60 METERED DOSES) 220         MCG/INH Inhaler, salmeterol (SEREVENT DISKUS)         50 MCG/DOSE diskus inhaler            (E78.5) Hyperlipidemia LDL goal <160  Comment: pt not fasting today. Will return fasting for labs  Plan: Lipid Profile            (R73.9) Hyperglycemia  Comment: weight is up this year. He is working on getting back into his home gym and watching diet more closely.  Plan: Glucose            (R00.2) Palpitations  Comment: had a friend recently die from MI at his age.  Interested in EBCT.  Plan: TSH with free T4 reflex            (R25.2) Cramp of limb  Comment:   Plan: taking Mg 400mg qd with potassium and not sure if this helping.    (Z13.6) screening for heart disease  Plan: pt would like to do the CT coronary calcium score test which is ordered.      Silvina Harvey PA-C      "

## 2018-03-15 NOTE — MR AVS SNAPSHOT
"              After Visit Summary   3/15/2018    Bobby Alexandre    MRN: [id number]           Patient Information     Date Of Birth          1970        Visit Information        Provider Department      3/15/2018 12:30 PM Silvina Harvey PA-C [facility]        Today's Diagnoses     Mild persistent asthma without complication    -  1    Hyperlipidemia LDL goal <160        Hyperglycemia        Palpitations           Follow-ups after your visit        Future tests that were ordered for you today     Open Future Orders        Priority Expected Expires Ordered    TSH with free T4 reflex Routine 3/16/2018 3/15/2019 3/15/2018    Glucose Routine 3/16/2018 4/15/2018 3/15/2018    Lipid Profile Routine 3/16/2018 4/15/2018 3/15/2018            Who to contact     If you have questions or need follow up information about today's clinic visit or your schedule please contact [facility] directly at [phone].  Normal or non-critical lab and imaging results will be communicated to you by MyChart, letter or phone within 4 business days after the clinic has received the results. If you do not hear from us within 7 days, please contact the clinic through [employer] or phone. If you have a critical or abnormal lab result, we will notify you by phone as soon as possible.  Submit refill requests through [employer] or call your pharmacy and they will forward the refill request to us. Please allow 3 business days for your refill to be completed.          Additional Information About Your Visit        [employer] Information     [employer] lets you send messages to your doctor, view your test results, renew your prescriptions, schedule appointments and more. To sign up, go to [email or web address] . Click on \"Log in\" on the left side of the screen, which will take you to the Welcome page. Then click on \"Sign up Now\" on the right side of the page.     You will be asked to enter the access code listed below, as well " "as some personal information. Please follow the directions to create your username and password.     Your access code is: [email or web address]  Expires: 2018  6:40 PM     Your access code will  in 90 days. If you need help or a new code, please call your [facility] clinic or [phone].        Care EveryWhere ID     This is your Care EveryWhere ID. This could be used by other organizations to access your [facility] medical records  [id number]        Your Vitals Were     Pulse Temperature Height Pulse Oximetry BMI (Body Mass Index)       93 97.6  F (36.4  C) (Tympanic) 6' 3\" (1.905 m) 98% 30.12 kg/m2        Blood Pressure from Last 3 Encounters:   03/15/18 126/87   18 (!) 123/93   17 110/84    Weight from Last 3 Encounters:   03/15/18 241 lb (109.3 kg)   18 245 lb (111.1 kg)   17 233 lb (105.7 kg)                 Today's Medication Changes          These changes are accurate as of 3/15/18  1:08 PM.  If you have any questions, ask your nurse or doctor.               Stop taking these medicines if you haven't already. Please contact your care team if you have questions.     rOPINIRole 0.5 MG tablet   Commonly known as:  REQUIP   Stopped by:  Silvina Harvey PA-C                Where to get your medicines      [id number]  [place] - [address]  [address] [place] [address]    Hours:  24-hours Phone:  [phone]     mometasone 220 MCG/INH Inhaler    salmeterol 50 MCG/DOSE diskus inhaler                Primary Care Provider Office Phone # Fax #    Silvina Harvey PA-C [phone] [address]  [place] [address]        Equal Access to Services     [facility] AH: Hadii aad ku hadasho Soomaali, waaxda luqadaha, qaybta kaalmada adeegyada, waxay idiin hayaan adeeg kharash la'aan ah. [facility] [phone].    ATENCIÓN: Si habla español, tiene a mendoza disposición servicios gratuitos de " radha alcocerjuana. Rashid al [phone].    We comply with applicable federal civil rights laws and [place] laws. We do not discriminate on the basis of race, color, national origin, age, disability, sex, sexual orientation, or gender identity.            Thank you!     Thank you for choosing [facility]  for your care. Our goal is always to provide you with excellent care. Hearing back from our patients is one way we can continue to improve our services. Please take a few minutes to complete the written survey that you may receive in the mail after your visit with us. Thank you!             Your Updated Medication List - Protect others around you: Learn how to safely use, store and throw away your medicines at www.disposemymeds.org.          This list is accurate as of 3/15/18  1:08 PM.  Always use your most recent med list.                   Brand Name Dispense Instructions for use Diagnosis    albuterol 108 (90 BASE) MCG/ACT Inhaler    PROAIR HFA/PROVENTIL HFA/VENTOLIN HFA    3 Inhaler    Inhale 2 puffs into the lungs every 6 hours as needed for shortness of breath / dyspnea    Mild persistent asthma without complication       loratadine-pseudoePHEDrine  MG per 24 hr tablet    CLARITIN-D 24-hour    30 tablet    Take 1 tablet by mouth daily as needed    Chronic rhinitis       MAGNESIUM-POTASSIUM PO           mometasone 220 MCG/INH Inhaler    ASMANEX 60 METERED DOSES    3 Inhaler    INHALE 1 PUFF INTO THE LUNGS TWICE DAILY    Mild persistent asthma without complication       MULTIVITAMIN ADULT PO           salmeterol 50 MCG/DOSE diskus inhaler    SEREVENT DISKUS    180 Inhaler    INHALE 1 PUFF INTO THE LUNGS TWICE DAILY    Mild persistent asthma without complication       triamcinolone 0.1 % cream    KENALOG    30 g    Apply sparingly to affected area three times daily for 14 days.    Tinea cruris       vitamin D 2000 UNITS tablet      Take 1 tablet by mouth daily

## 2018-03-15 NOTE — NURSING NOTE
"Chief Complaint   Patient presents with     RECHECK       Initial /87 (BP Location: Right arm, Cuff Size: Adult Large)  Pulse 93  Temp 97.6  F (36.4  C) (Tympanic)  Ht 6' 3\" (1.905 m)  Wt 241 lb (109.3 kg)  SpO2 98%  BMI 30.12 kg/m2 Estimated body mass index is 30.12 kg/(m^2) as calculated from the following:    Height as of this encounter: 6' 3\" (1.905 m).    Weight as of this encounter: 241 lb (109.3 kg).  Medication Reconciliation: complete   Vonnie Medrano MA        "

## 2018-03-16 ASSESSMENT — ASTHMA QUESTIONNAIRES: ACT_TOTALSCORE: 25

## 2018-04-10 ENCOUNTER — OFFICE VISIT (OUTPATIENT)
Dept: FAMILY MEDICINE | Facility: CLINIC | Age: 48
End: 2018-04-10
Payer: COMMERCIAL

## 2018-04-10 VITALS
HEIGHT: 75 IN | SYSTOLIC BLOOD PRESSURE: 136 MMHG | DIASTOLIC BLOOD PRESSURE: 85 MMHG | BODY MASS INDEX: 30.34 KG/M2 | WEIGHT: 244 LBS | RESPIRATION RATE: 20 BRPM | HEART RATE: 121 BPM | OXYGEN SATURATION: 99 % | TEMPERATURE: 99.2 F

## 2018-04-10 DIAGNOSIS — J45.41 MODERATE PERSISTENT ASTHMA WITH EXACERBATION: Primary | ICD-10-CM

## 2018-04-10 PROCEDURE — 99213 OFFICE O/P EST LOW 20 MIN: CPT | Performed by: PHYSICIAN ASSISTANT

## 2018-04-10 RX ORDER — PREDNISONE 20 MG/1
20 TABLET ORAL 2 TIMES DAILY
Qty: 10 TABLET | Refills: 0 | Status: SHIPPED | OUTPATIENT
Start: 2018-04-10 | End: 2018-12-18

## 2018-04-10 RX ORDER — DOXYCYCLINE 100 MG/1
100 CAPSULE ORAL 2 TIMES DAILY
Qty: 20 CAPSULE | Refills: 0 | Status: SHIPPED | OUTPATIENT
Start: 2018-04-10 | End: 2018-12-18

## 2018-04-10 NOTE — NURSING NOTE
"Chief Complaint   Patient presents with     Cough       Initial /85 (BP Location: Right arm, Patient Position: Sitting, Cuff Size: Adult Large)  Pulse 121  Temp 99.2  F (37.3  C) (Tympanic)  Resp 20  Ht 6' 3\" (1.905 m)  Wt 244 lb (110.7 kg)  SpO2 99%  BMI 30.5 kg/m2 Estimated body mass index is 30.5 kg/(m^2) as calculated from the following:    Height as of this encounter: 6' 3\" (1.905 m).    Weight as of this encounter: 244 lb (110.7 kg).  Medication Reconciliation: complete.  CARMEN Prakash      "

## 2018-04-10 NOTE — PROGRESS NOTES
HPI: 46 yo male with asthma here with upper resp sxs starting in the past week  He has a cough that is painful, persistent and nonprod  He feels sob and feels tight  Using albuterol which helps a little  Has temp of 99.2 today  Feels achy in the past 3 days  Has had some chills.  Has burning in the sinuses and some drainage  Some ear pressure although mild R>L  No sick contacts at home  No hx of pneumonia    Past Medical History:   Diagnosis Date     Basal cell carcinoma, scalp/neck 2015    s/p mohs     Mild persistent asthma      Past Surgical History:   Procedure Laterality Date     TONSILLECTOMY       Social History   Substance Use Topics     Smoking status: Never Smoker     Smokeless tobacco: Never Used     Alcohol use 0.0 oz/week     0 Standard drinks or equivalent per week      Comment: 1 drink per month     Current Outpatient Prescriptions   Medication Sig Dispense Refill     Pseudoephedrine-Guaifenesin (MUCINEX D PO)        predniSONE (DELTASONE) 20 MG tablet Take 1 tablet (20 mg) by mouth 2 times daily 10 tablet 0     doxycycline (VIBRAMYCIN) 100 MG capsule Take 1 capsule (100 mg) by mouth 2 times daily 20 capsule 0     Multiple Vitamins-Minerals (MULTIVITAMIN ADULT PO)        MAGNESIUM-POTASSIUM PO        mometasone (ASMANEX 60 METERED DOSES) 220 MCG/INH Inhaler INHALE 1 PUFF INTO THE LUNGS TWICE DAILY 3 Inhaler 3     salmeterol (SEREVENT DISKUS) 50 MCG/DOSE diskus inhaler INHALE 1 PUFF INTO THE LUNGS TWICE DAILY 180 Inhaler 3     albuterol (PROAIR HFA/PROVENTIL HFA/VENTOLIN HFA) 108 (90 BASE) MCG/ACT Inhaler Inhale 2 puffs into the lungs every 6 hours as needed for shortness of breath / dyspnea 3 Inhaler 3     loratadine-pseudoePHEDrine (CLARITIN-D 24-HOUR)  MG per 24 hr tablet Take 1 tablet by mouth daily as needed 30 tablet 3     triamcinolone (KENALOG) 0.1 % cream Apply sparingly to affected area three times daily for 14 days. 30 g 0     Cholecalciferol (VITAMIN D) 2000 UNITS tablet Take 1 tablet  "by mouth daily       Allergies   Allergen Reactions     No Known Allergies      FAMILY HISTORY NOTED AND REVIEWED    PHYSICAL EXAM:    /85 (BP Location: Right arm, Patient Position: Sitting, Cuff Size: Adult Large)  Pulse 121  Temp 99.2  F (37.3  C) (Tympanic)  Resp 20  Ht 6' 3\" (1.905 m)  Wt 244 lb (110.7 kg)  SpO2 99%  BMI 30.5 kg/m2    Patient has raspy voice  Ears: TMs pearly grey  Throat: no erythema or exudates  Neck: supple without LA  Lungs: bilat expiratory wheezing throughout  Heart: tachycardic with rate of 100  Extr: no edema  Skin: no rash    Assessment and Plan:     (J45.41) Moderate persistent asthma with exacerbation  (primary encounter diagnosis)  Comment:   Plan: predniSONE (DELTASONE) 20 MG tablet BID with food x 5d,         doxycycline (VIBRAMYCIN) 100 MG capsule        BID x 10d.  Use rescue inhaler prn.      Silvina Harvey PA-C        "

## 2018-04-10 NOTE — MR AVS SNAPSHOT
After Visit Summary   4/10/2018    Bobby Alexandre    MRN: [id number]           Patient Information     Date Of Birth          1970        Visit Information        Provider Department      4/10/2018 10:00 AM Silvina Harvey PA-C [facility]        Today's Diagnoses     Moderate persistent asthma with exacerbation    -  1       Follow-ups after your visit        Your next 10 appointments already scheduled     Apr 23, 2018  8:00 AM CDT   CT CALCIUM SCREENING with SCICT1   [facility] (Cardiovascular Imaging [facility])    [address] [place] [address]  [address]  [facility] [address]   [phone]           It is best to avoid caffeine on the day of your test.  Be sure to tell your doctor:   If there s any chance you are pregnant.   If you have any special needs.  Please wear loose clothing, such as a sweat suit or jogging clothes. Avoid snaps, zippers and other metal. We may ask you to undress and put on a hospital gown.  If you have any questions, please call the Imaging Department where you will have your exam.              Who to contact     If you have questions or need follow up information about today's clinic visit or your schedule please contact [facility] directly at [phone].  Normal or non-critical lab and imaging results will be communicated to you by [employer], letter or phone within 4 business days after the clinic has received the results. If you do not hear from us within 7 days, please contact the clinic through [employer] or phone. If you have a critical or abnormal lab result, we will notify you by phone as soon as possible.  Submit refill requests through [employer] or call your pharmacy and they will forward the refill request to us. Please allow 3 business days for your refill to be completed.          Additional Information About Your Visit        [facility] Information     [facility] lets you send messages to your doctor,  "view your test results, renew your prescriptions, schedule appointments and more. To sign up, go to [email or web address] . Click on \"Log in\" on the left side of the screen, which will take you to the Welcome page. Then click on \"Sign up Now\" on the right side of the page.     You will be asked to enter the access code listed below, as well as some personal information. Please follow the directions to create your username and password.     Your access code is: [email or web address]  Expires: 2018 10:27 AM     Your access code will  in 90 days. If you need help or a new code, please call your [facility] clinic or [phone].        Care EveryWhere ID     This is your Care EveryWhere ID. This could be used by other organizations to access your [facility] medical records  [id number]        Your Vitals Were     Pulse Temperature Respirations Height Pulse Oximetry BMI (Body Mass Index)    121 99.2  F (37.3  C) (Tympanic) 20 6' 3\" (1.905 m) 99% 30.5 kg/m2       Blood Pressure from Last 3 Encounters:   04/10/18 136/85   03/15/18 126/87   18 (!) 123/93    Weight from Last 3 Encounters:   04/10/18 244 lb (110.7 kg)   03/15/18 241 lb (109.3 kg)   18 245 lb (111.1 kg)              Today, you had the following     No orders found for display         Today's Medication Changes          These changes are accurate as of 4/10/18 10:27 AM.  If you have any questions, ask your nurse or doctor.               Start taking these medicines.        Dose/Directions    doxycycline 100 MG capsule   Commonly known as:  VIBRAMYCIN   Used for:  Moderate persistent asthma with exacerbation   Started by:  Silvina Harvey PA-C        Dose:  100 mg   Take 1 capsule (100 mg) by mouth 2 times daily   Quantity:  20 capsule   Refills:  0       predniSONE 20 MG tablet   Commonly known as:  DELTASONE   Used for:  Moderate persistent asthma with exacerbation   Started by:  Silvina Harvey PA-C        Dose:  20 mg   Take 1 tablet (20 mg) " by mouth 2 times daily   Quantity:  10 tablet   Refills:  0            Where to get your medicines      These medications were sent to [id number]  [place], [place] - [address] [facility]  [address] [place] [address] [address]    Hours:  24-hours Phone:  [phone]     doxycycline 100 MG capsule    predniSONE 20 MG tablet                Primary Care Provider Office Phone # Fax #    Silvina Harvey PA-C [phone] [address] [facility] [address]  [place] [address]        Equal Access to Services     [facility]: Hadii aad ku hadasho Soomaali, waaxda luqadaha, qaybta kaalmada adeegyada, waxay idiin hayaan adeeg neena fajardo . So [facility] [phone].    ATENCIÓN: Si habla español, tiene a mendoza disposición servicios gratuitos de asistencia lingüística. Llame al [phone].    We comply with applicable federal civil rights laws and [place] laws. We do not discriminate on the basis of race, color, national origin, age, disability, sex, sexual orientation, or gender identity.            Thank you!     Thank you for choosing [facility]  for your care. Our goal is always to provide you with excellent care. Hearing back from our patients is one way we can continue to improve our services. Please take a few minutes to complete the written survey that you may receive in the mail after your visit with us. Thank you!             Your Updated Medication List - Protect others around you: Learn how to safely use, store and throw away your medicines at www.disposemymeds.org.          This list is accurate as of 4/10/18 10:27 AM.  Always use your most recent med list.                   Brand Name Dispense Instructions for use Diagnosis    albuterol 108 (90 BASE) MCG/ACT Inhaler    PROAIR HFA/PROVENTIL HFA/VENTOLIN HFA    3 Inhaler    Inhale 2 puffs into the lungs every 6 hours as needed for shortness of breath / dyspnea    Mild persistent asthma without complication        doxycycline 100 MG capsule    VIBRAMYCIN    20 capsule    Take 1 capsule (100 mg) by mouth 2 times daily    Moderate persistent asthma with exacerbation       loratadine-pseudoePHEDrine  MG per 24 hr tablet    CLARITIN-D 24-hour    30 tablet    Take 1 tablet by mouth daily as needed    Chronic rhinitis       MAGNESIUM-POTASSIUM PO           mometasone 220 MCG/INH Inhaler    ASMANEX 60 METERED DOSES    3 Inhaler    INHALE 1 PUFF INTO THE LUNGS TWICE DAILY    Mild persistent asthma without complication       MUCINEX D PO           MULTIVITAMIN ADULT PO           predniSONE 20 MG tablet    DELTASONE    10 tablet    Take 1 tablet (20 mg) by mouth 2 times daily    Moderate persistent asthma with exacerbation       salmeterol 50 MCG/DOSE diskus inhaler    SEREVENT DISKUS    180 Inhaler    INHALE 1 PUFF INTO THE LUNGS TWICE DAILY    Mild persistent asthma without complication       triamcinolone 0.1 % cream    KENALOG    30 g    Apply sparingly to affected area three times daily for 14 days.    Tinea cruris       vitamin D 2000 UNITS tablet      Take 1 tablet by mouth daily

## 2018-04-27 ENCOUNTER — HOSPITAL ENCOUNTER (OUTPATIENT)
Dept: CARDIOLOGY | Facility: CLINIC | Age: 48
Discharge: HOME | End: 2018-04-27
Attending: PHYSICIAN ASSISTANT | Admitting: PHYSICIAN ASSISTANT
Payer: COMMERCIAL

## 2018-04-27 DIAGNOSIS — Z13.6 SCREENING FOR HEART DISEASE: ICD-10-CM

## 2018-04-27 PROCEDURE — 75571 CT HRT W/O DYE W/CA TEST: CPT | Mod: 26 | Performed by: INTERNAL MEDICINE

## 2018-04-27 PROCEDURE — 75571 CT HRT W/O DYE W/CA TEST: CPT

## 2018-04-27 NOTE — LETTER
[facility]  [address] [facility]  [address]  [place]  [address]  Tel: [phone]    May 1, 2018    Bobby Alexandre  [address] [facility] [address]        Dear Mr. Alexandre,    It was a pleasure seeing you.  I wanted to get back to you with your test results.  I have enclosed a copy for your records.    Your cardiac calcium score test gave a total score of 7.32. This places you in the 76th% when compared to age and gender matched control group meaning you have more plaque burden than 76% of those your age/gender.  The recommendations for this score would be risk factor management including a well controlled blood pressure, exercise, and cholesterol treatment if LDL >100.  I did put in orders for you to come in for labs to have the cholesterol, blood sugar and thyroid checked so schedule those labs if you haven't already.  A heart healthy diet and regular exercise are also obviously advised for good heart health.    Let me know if you have any questions.    Sincerely,    Silvina Harvey PA-C/GIORGIO

## 2018-05-01 NOTE — PROGRESS NOTES
It was a pleasure seeing you.  I wanted to get back to you with your test results.  I have enclosed a copy for your records.    Your cardiac calcium score test gave a total score of 7.32. This places you in the 76th% when compared to age and gender matched control group meaning you have more plaque burden than 76% of those your age/gender.  The recommendations for this score would be risk factor management including a well controlled blood pressure, exercise, and cholesterol treatment if LDL >100.  I did put in orders for you to come in for labs to have the cholesterol, blood sugar and thyroid checked so schedule those labs if you haven't already.  A heart healthy diet and regular exercise are also obviously advised for good heart health.    Let me know if you have any questions.    Silvina Harvey PA-C

## 2018-12-18 ENCOUNTER — OFFICE VISIT (OUTPATIENT)
Dept: FAMILY MEDICINE | Facility: CLINIC | Age: 48
End: 2018-12-18
Payer: COMMERCIAL

## 2018-12-18 VITALS
OXYGEN SATURATION: 98 % | BODY MASS INDEX: 29.97 KG/M2 | DIASTOLIC BLOOD PRESSURE: 91 MMHG | WEIGHT: 241 LBS | SYSTOLIC BLOOD PRESSURE: 130 MMHG | HEART RATE: 68 BPM | HEIGHT: 75 IN | TEMPERATURE: 98 F

## 2018-12-18 DIAGNOSIS — J45.41 MODERATE PERSISTENT ASTHMA WITH EXACERBATION: ICD-10-CM

## 2018-12-18 DIAGNOSIS — Z00.00 ROUTINE HISTORY AND PHYSICAL EXAMINATION OF ADULT: Primary | ICD-10-CM

## 2018-12-18 DIAGNOSIS — B35.6 TINEA CRURIS: ICD-10-CM

## 2018-12-18 PROCEDURE — 99213 OFFICE O/P EST LOW 20 MIN: CPT | Performed by: PHYSICIAN ASSISTANT

## 2018-12-18 RX ORDER — DOXYCYCLINE 100 MG/1
100 CAPSULE ORAL 2 TIMES DAILY
Qty: 20 CAPSULE | Refills: 0 | Status: SHIPPED | OUTPATIENT
Start: 2018-12-18 | End: 2019-03-05

## 2018-12-18 RX ORDER — PREDNISONE 20 MG/1
20 TABLET ORAL 2 TIMES DAILY
Qty: 10 TABLET | Refills: 0 | Status: SHIPPED | OUTPATIENT
Start: 2018-12-18 | End: 2019-03-05

## 2018-12-18 RX ORDER — TRIAMCINOLONE ACETONIDE 1 MG/G
CREAM TOPICAL
Qty: 30 G | Refills: 0 | Status: SHIPPED | OUTPATIENT
Start: 2018-12-18 | End: 2020-03-10

## 2018-12-18 ASSESSMENT — MIFFLIN-ST. JEOR: SCORE: 2048.8

## 2018-12-18 NOTE — PROGRESS NOTES
HPI: Bobby is a 49 yo male with asthma here with being sick for a week  He has 2 kids that are sick as well  He has laryngitis, cough prod of brown sputum  He has pressure in ears as well  Has hx of ear infections as an adult  He has sinus congestion and nasal drainage which is clear.  He has felt hot and cold  He feels sick and tired  Appetite is okay  Needing to use his rescue inhaler  He is taking mucinex and claritin D which help a little  sxs seem to be getting worse  Supposed to fly out east tomorrow.    Past Medical History:   Diagnosis Date     Basal cell carcinoma, scalp/neck 2015    s/p mohs     Mild persistent asthma      Past Surgical History:   Procedure Laterality Date     TONSILLECTOMY       Social History     Tobacco Use     Smoking status: Never Smoker     Smokeless tobacco: Never Used   Substance Use Topics     Alcohol use: Yes     Alcohol/week: 0.0 oz     Comment: 1 drink per month     Current Outpatient Medications   Medication Sig Dispense Refill     albuterol (PROAIR HFA/PROVENTIL HFA/VENTOLIN HFA) 108 (90 BASE) MCG/ACT Inhaler Inhale 2 puffs into the lungs every 6 hours as needed for shortness of breath / dyspnea 3 Inhaler 3     Cholecalciferol (VITAMIN D) 2000 UNITS tablet Take 1 tablet by mouth daily       doxycycline hyclate (VIBRAMYCIN) 100 MG capsule Take 1 capsule (100 mg) by mouth 2 times daily 20 capsule 0     mometasone (ASMANEX 60 METERED DOSES) 220 MCG/INH Inhaler INHALE 1 PUFF INTO THE LUNGS TWICE DAILY 3 Inhaler 3     Multiple Vitamins-Minerals (MULTIVITAMIN ADULT PO)        predniSONE (DELTASONE) 20 MG tablet Take 20 mg by mouth 2 times daily. 10 tablet 0     Pseudoephedrine-Guaifenesin (MUCINEX D PO)        salmeterol (SEREVENT DISKUS) 50 MCG/DOSE diskus inhaler INHALE 1 PUFF INTO THE LUNGS TWICE DAILY 180 Inhaler 3     triamcinolone (KENALOG) 0.1 % external cream Apply sparingly to affected area three times daily for 14 days. 30 g 0     loratadine-pseudoePHEDrine (CLARITIN-D  "24-HOUR)  MG per 24 hr tablet Take 1 tablet by mouth daily as needed 30 tablet 3     MAGNESIUM-POTASSIUM PO        Allergies   Allergen Reactions     No Known Allergies      FAMILY HISTORY NOTED AND REVIEWED    PHYSICAL EXAM:    BP (!) 130/91 (BP Location: Right arm, Patient Position: Sitting, Cuff Size: Adult Large)   Pulse 68   Temp 98  F (36.7  C) (Tympanic)   Ht 1.905 m (6' 3\")   Wt 109.3 kg (241 lb)   SpO2 98%   BMI 30.12 kg/m      Patient appears non toxic  Sounds hoarse.  Ears: TMs with effusion on L, TMs pearly grey  Nose: +erythema, edema, yellow discharge  Throat: no erythema or exudates  Neck: supple without LA  Lungs: upper airway wheezing, otherwise clear  Heart; RRR    Assessment and Plan:             (J45.41) Moderate persistent asthma with exacerbation  Comment:   Plan: doxycycline hyclate (VIBRAMYCIN) 100 MG         capsule, predniSONE (DELTASONE) 20 MG tablet        Bid with food x 5d.    (B35.6) Tinea cruris  Comment:   Plan: triamcinolone (KENALOG) 0.1 % external cream        He mixes a small amount of this with lamisil prn jock itch.      Silvina Harvey PA-C        "

## 2018-12-28 ENCOUNTER — TELEPHONE (OUTPATIENT)
Dept: FAMILY MEDICINE | Facility: CLINIC | Age: 48
End: 2018-12-28

## 2018-12-28 NOTE — LETTER
"[facility]  [address] [facility]  [address]  [place]  [address]  Tel: [phone]    December 28, 2018    Bobby MEJÍA Bettie  [address] [facility] [place] [address]        Dear Mr. Alexandre,      We postponed doing your Asthma Control Test when you were in for your recent appointment.  When you are feeling better please complete the enclosed \"ACT\"and either     mail back to us  or  fax [phone] back to us  or  call [phone] us with your answers (ask for any medical  assist).     Due to copyright laws we are unable to ask you the questions over the phone without the form visible to you.         Sincerely,    Maricarmen GOYAL MA on behalf of Silvina Harvey PA-C        Enc: ACT form    "

## 2019-02-04 DIAGNOSIS — R00.2 PALPITATIONS: ICD-10-CM

## 2019-02-04 DIAGNOSIS — Z00.00 ROUTINE HISTORY AND PHYSICAL EXAMINATION OF ADULT: ICD-10-CM

## 2019-02-04 LAB
ALBUMIN SERPL-MCNC: 4 G/DL (ref 3.4–5)
ALP SERPL-CCNC: 77 U/L (ref 40–150)
ALT SERPL W P-5'-P-CCNC: 38 U/L (ref 0–70)
ANION GAP SERPL CALCULATED.3IONS-SCNC: 6 MMOL/L (ref 3–14)
AST SERPL W P-5'-P-CCNC: 23 U/L (ref 0–45)
BASOPHILS # BLD AUTO: 0 10E9/L (ref 0–0.2)
BASOPHILS NFR BLD AUTO: 0.2 %
BILIRUB SERPL-MCNC: 0.5 MG/DL (ref 0.2–1.3)
BUN SERPL-MCNC: 20 MG/DL (ref 7–30)
CALCIUM SERPL-MCNC: 9 MG/DL (ref 8.5–10.1)
CHLORIDE SERPL-SCNC: 109 MMOL/L (ref 94–109)
CHOLEST SERPL-MCNC: 230 MG/DL
CO2 SERPL-SCNC: 23 MMOL/L (ref 20–32)
CREAT SERPL-MCNC: 0.98 MG/DL (ref 0.66–1.25)
DIFFERENTIAL METHOD BLD: ABNORMAL
EOSINOPHIL # BLD AUTO: 0.1 10E9/L (ref 0–0.7)
EOSINOPHIL NFR BLD AUTO: 2.6 %
ERYTHROCYTE [DISTWIDTH] IN BLOOD BY AUTOMATED COUNT: 13.3 % (ref 10–15)
GFR SERPL CREATININE-BSD FRML MDRD: >90 ML/MIN/{1.73_M2}
GLUCOSE SERPL-MCNC: 109 MG/DL (ref 70–99)
HCT VFR BLD AUTO: 42.6 % (ref 40–53)
HDLC SERPL-MCNC: 51 MG/DL
HGB BLD-MCNC: 14.6 G/DL (ref 13.3–17.7)
LDLC SERPL CALC-MCNC: 153 MG/DL
LYMPHOCYTES # BLD AUTO: 1.2 10E9/L (ref 0.8–5.3)
LYMPHOCYTES NFR BLD AUTO: 23.2 %
MCH RBC QN AUTO: 29.1 PG (ref 26.5–33)
MCHC RBC AUTO-ENTMCNC: 34.3 G/DL (ref 31.5–36.5)
MCV RBC AUTO: 85 FL (ref 78–100)
MONOCYTES # BLD AUTO: 0.4 10E9/L (ref 0–1.3)
MONOCYTES NFR BLD AUTO: 8.8 %
NEUTROPHILS # BLD AUTO: 3.3 10E9/L (ref 1.6–8.3)
NEUTROPHILS NFR BLD AUTO: 65.2 %
NONHDLC SERPL-MCNC: 179 MG/DL
PLATELET # BLD AUTO: 137 10E9/L (ref 150–450)
POTASSIUM SERPL-SCNC: 4.1 MMOL/L (ref 3.4–5.3)
PROT SERPL-MCNC: 6.8 G/DL (ref 6.8–8.8)
RBC # BLD AUTO: 5.01 10E12/L (ref 4.4–5.9)
SODIUM SERPL-SCNC: 138 MMOL/L (ref 133–144)
TRIGL SERPL-MCNC: 132 MG/DL
TSH SERPL DL<=0.005 MIU/L-ACNC: 1.31 MU/L (ref 0.4–4)
WBC # BLD AUTO: 5 10E9/L (ref 4–11)

## 2019-02-04 PROCEDURE — 36415 COLL VENOUS BLD VENIPUNCTURE: CPT | Performed by: PHYSICIAN ASSISTANT

## 2019-02-04 PROCEDURE — 80061 LIPID PANEL: CPT | Performed by: PHYSICIAN ASSISTANT

## 2019-02-04 PROCEDURE — 84443 ASSAY THYROID STIM HORMONE: CPT | Performed by: PHYSICIAN ASSISTANT

## 2019-02-04 PROCEDURE — 80053 COMPREHEN METABOLIC PANEL: CPT | Performed by: PHYSICIAN ASSISTANT

## 2019-02-04 PROCEDURE — 85025 COMPLETE CBC W/AUTO DIFF WBC: CPT | Performed by: PHYSICIAN ASSISTANT

## 2019-03-05 ENCOUNTER — OFFICE VISIT (OUTPATIENT)
Dept: FAMILY MEDICINE | Facility: CLINIC | Age: 49
End: 2019-03-05
Payer: COMMERCIAL

## 2019-03-05 VITALS
BODY MASS INDEX: 30.15 KG/M2 | TEMPERATURE: 96.8 F | HEIGHT: 75 IN | DIASTOLIC BLOOD PRESSURE: 83 MMHG | OXYGEN SATURATION: 95 % | SYSTOLIC BLOOD PRESSURE: 127 MMHG | HEART RATE: 80 BPM | WEIGHT: 242.5 LBS

## 2019-03-05 DIAGNOSIS — Z23 NEED FOR PROPHYLACTIC VACCINATION AND INOCULATION AGAINST INFLUENZA: ICD-10-CM

## 2019-03-05 DIAGNOSIS — J45.30 MILD PERSISTENT ASTHMA WITHOUT COMPLICATION: ICD-10-CM

## 2019-03-05 DIAGNOSIS — E78.5 HYPERLIPIDEMIA LDL GOAL <160: ICD-10-CM

## 2019-03-05 DIAGNOSIS — Z00.00 ROUTINE GENERAL MEDICAL EXAMINATION AT HEALTH CARE FACILITY: Primary | ICD-10-CM

## 2019-03-05 PROCEDURE — 99396 PREV VISIT EST AGE 40-64: CPT | Performed by: PHYSICIAN ASSISTANT

## 2019-03-05 PROCEDURE — 90686 IIV4 VACC NO PRSV 0.5 ML IM: CPT | Performed by: PHYSICIAN ASSISTANT

## 2019-03-05 PROCEDURE — 90471 IMMUNIZATION ADMIN: CPT | Performed by: PHYSICIAN ASSISTANT

## 2019-03-05 RX ORDER — CETIRIZINE HYDROCHLORIDE 10 MG/1
10 TABLET ORAL DAILY
COMMUNITY
Start: 2019-03-05

## 2019-03-05 RX ORDER — ALBUTEROL SULFATE 90 UG/1
2 AEROSOL, METERED RESPIRATORY (INHALATION) EVERY 6 HOURS PRN
Qty: 3 INHALER | Refills: 3 | Status: SHIPPED | OUTPATIENT
Start: 2019-03-05 | End: 2020-03-10

## 2019-03-05 ASSESSMENT — MIFFLIN-ST. JEOR: SCORE: 2055.6

## 2019-03-05 NOTE — PROGRESS NOTES
Injectable Influenza Immunization Documentation    1.  Is the person to be vaccinated sick today?   No    2. Does the person to be vaccinated have an allergy to a component   of the vaccine?   No  Egg Allergy Algorithm Link    3. Has the person to be vaccinated ever had a serious reaction   to influenza vaccine in the past?   No    4. Has the person to be vaccinated ever had Guillain-Barré syndrome?   No    Form completed by patient  Prior to injection, verified patient identity using patient's name and date of birth.  Due to injection administration, patient instructed to remain in clinic for 15 minutes  afterwards, and to report any adverse reaction to me immediately.    Chelsea Amaro MA

## 2019-03-05 NOTE — PROGRESS NOTES
SUBJECTIVE:   CC: Bobby Alexandre is an 48 year old male who presents for preventive health visit.     Pt going to spin classes for exercise  Had upper resp illness around the holidays and on abx and prednisone in Dec  Tries to watch the carbs.  Going to the [employer] for spring break (son is a senior)  Had sinus problem a few weeks ago but that resolved  Asthma well controlled otherwise.  He sees his eye doctor annually and had 3-4 episodes of blurred vision like an eye is out of alignment x 4-5 months  He can't tell which eye this is.  Wears glasses.   Has hx of optic migraines once or twice per year and notes he had a few in the past few months.  He has hx of skin ca and sees derm annually (Dr. Burton or Xu)    Healthy Habits:    Do you get at least three servings of calcium containing foods daily (dairy, green leafy vegetables, etc.)? yes    Amount of exercise or daily activities, outside of work: 4-5 day(s) per week    Problems taking medications regularly No    Medication side effects: No    Have you had an eye exam in the past two years? yes    Do you see a dentist twice per year? yes    Do you have sleep apnea, excessive snoring or daytime drowsiness?no    Today's PHQ-2 Score:   PHQ-2 ( 1999 Pfizer) 3/5/2019 4/10/2018   Q1: Little interest or pleasure in doing things 0 0   Q2: Feeling down, depressed or hopeless 0 0   PHQ-2 Score 0 0       Abuse: Current or Past(Physical, Sexual or Emotional)- No  Do you feel safe in your environment? Yes    Social History     Tobacco Use     Smoking status: Never Smoker     Smokeless tobacco: Never Used   Substance Use Topics     Alcohol use: Yes     Alcohol/week: 0.0 oz     Comment: 4-5 drinks per week     If you drink alcohol do you typically have >3 drinks per day or >7 drinks per week? No                      Last PSA: No results found for: PSA    Reviewed orders with patient. Reviewed health maintenance and updated orders accordingly - Yes      Reviewed and  updated as needed this visit by clinical staff  Tobacco  Allergies  Meds  Med Hx  Fam Hx  Soc Hx        Reviewed and updated as needed this visit by Provider  Allergies  Meds  Med Hx  Fam Hx        Past Medical History:   Diagnosis Date     Basal cell carcinoma, scalp/neck 2015    s/p mohs     Mild persistent asthma      Past Surgical History:   Procedure Laterality Date     TONSILLECTOMY       Current Outpatient Medications   Medication Sig Dispense Refill     albuterol (PROAIR HFA/PROVENTIL HFA/VENTOLIN HFA) 108 (90 Base) MCG/ACT inhaler Inhale 2 puffs into the lungs every 6 hours as needed for shortness of breath / dyspnea 3 Inhaler 3     cetirizine (ZYRTEC) 10 MG tablet Take 1 tablet (10 mg) by mouth daily       Cholecalciferol (VITAMIN D) 2000 UNITS tablet Take 1 tablet by mouth daily       mometasone (ASMANEX 60 METERED DOSES) 220 MCG/INH inhaler INHALE 1 PUFF INTO THE LUNGS TWICE DAILY 3 Inhaler 3     Multiple Vitamins-Minerals (AIRBORNE GUMMIES PO) Take by mouth daily       Pseudoephedrine-Guaifenesin (MUCINEX D PO)        salmeterol (SEREVENT DISKUS) 50 MCG/DOSE inhaler INHALE 1 PUFF INTO THE LUNGS TWICE DAILY 180 Inhaler 3     triamcinolone (KENALOG) 0.1 % external cream Apply sparingly to affected area three times daily for 14 days. (Patient not taking: Reported on 3/5/2019) 30 g 0         ROS:  CONSTITUTIONAL: NEGATIVE for fever, chills, change in weight  INTEGUMENTARY/SKIN: NEGATIVE for worrisome rashes, moles or lesions  EYES: NEGATIVE for vision changes or irritation  ENT: NEGATIVE for ear, mouth and throat problems  RESP: NEGATIVE for significant cough or SOB  CV: NEGATIVE for chest pain, palpitations or peripheral edema  GI: NEGATIVE for nausea, abdominal pain, heartburn, or change in bowel habits   male: negative for dysuria, hematuria, decreased urinary stream, erectile dysfunction, urethral discharge  MUSCULOSKELETAL: NEGATIVE for significant arthralgias or myalgia  NEURO: NEGATIVE for  "weakness, dizziness or paresthesias  PSYCHIATRIC: NEGATIVE for changes in mood or affect    OBJECTIVE:   /83 (BP Location: Right arm, Cuff Size: Adult Large)   Pulse 80   Temp 96.8  F (36  C) (Oral)   Ht 1.905 m (6' 3\")   Wt 110 kg (242 lb 8 oz)   SpO2 95%   BMI 30.31 kg/m    EXAM:  GENERAL: healthy, alert and no distress  EYES: Eyes grossly normal to inspection, PERRL and conjunctivae and sclerae normal  HENT: ear canals and TM's normal, nose and mouth without ulcers or lesions  NECK: no adenopathy, no asymmetry, masses, or scars and thyroid normal to palpation  RESP: lungs clear to auscultation - no rales, rhonchi or wheezes  CV: regular rate and rhythm, normal S1 S2, no S3 or S4, no murmur, click or rub, no peripheral edema and peripheral pulses strong  ABDOMEN: soft, nontender, no hepatosplenomegaly, no masses and bowel sounds normal  Testicular exam normal without masses.  MS: no gross musculoskeletal defects noted, no edema  SKIN: no suspicious lesions or rashes  NEURO: Normal strength and tone, mentation intact and speech normal  PSYCH: mentation appears normal, affect normal/bright    Results for orders placed or performed in visit on 02/04/19   **CBC with platelets differential FUTURE 2mo   Result Value Ref Range    WBC 5.0 4.0 - 11.0 10e9/L    RBC Count 5.01 4.4 - 5.9 10e12/L    Hemoglobin 14.6 13.3 - 17.7 g/dL    Hematocrit 42.6 40.0 - 53.0 %    MCV 85 78 - 100 fl    MCH 29.1 26.5 - 33.0 pg    MCHC 34.3 31.5 - 36.5 g/dL    RDW 13.3 10.0 - 15.0 %    Platelet Count 137 (L) 150 - 450 10e9/L    % Neutrophils 65.2 %    % Lymphocytes 23.2 %    % Monocytes 8.8 %    % Eosinophils 2.6 %    % Basophils 0.2 %    Absolute Neutrophil 3.3 1.6 - 8.3 10e9/L    Absolute Lymphocytes 1.2 0.8 - 5.3 10e9/L    Absolute Monocytes 0.4 0.0 - 1.3 10e9/L    Absolute Eosinophils 0.1 0.0 - 0.7 10e9/L    Absolute Basophils 0.0 0.0 - 0.2 10e9/L    Diff Method Automated Method    Lipid panel reflex to direct LDL Fasting "   Result Value Ref Range    Cholesterol 230 (H) <200 mg/dL    Triglycerides 132 <150 mg/dL    HDL Cholesterol 51 >39 mg/dL    LDL Cholesterol Calculated 153 (H) <100 mg/dL    Non HDL Cholesterol 179 (H) <130 mg/dL   **Comprehensive metabolic panel FUTURE anytime   Result Value Ref Range    Sodium 138 133 - 144 mmol/L    Potassium 4.1 3.4 - 5.3 mmol/L    Chloride 109 94 - 109 mmol/L    Carbon Dioxide 23 20 - 32 mmol/L    Anion Gap 6 3 - 14 mmol/L    Glucose 109 (H) 70 - 99 mg/dL    Urea Nitrogen 20 7 - 30 mg/dL    Creatinine 0.98 0.66 - 1.25 mg/dL    GFR Estimate >90 >60 mL/min/[1.73_m2]    GFR Estimate If Black >90 >60 mL/min/[1.73_m2]    Calcium 9.0 8.5 - 10.1 mg/dL    Bilirubin Total 0.5 0.2 - 1.3 mg/dL    Albumin 4.0 3.4 - 5.0 g/dL    Protein Total 6.8 6.8 - 8.8 g/dL    Alkaline Phosphatase 77 40 - 150 U/L    ALT 38 0 - 70 U/L    AST 23 0 - 45 U/L   TSH with free T4 reflex   Result Value Ref Range    TSH 1.31 0.40 - 4.00 mU/L         ASSESSMENT/PLAN:   Assessment and Plan:     (Z00.00) Routine general medical examination at health care facility  (primary encounter diagnosis)  Comment:   Plan: reviewed previsit labs. Recd wt loss.    (J45.30) Mild persistent asthma without complication  Comment: well controlled  Plan: albuterol (PROAIR HFA/PROVENTIL HFA/VENTOLIN         HFA) 108 (90 Base) MCG/ACT inhaler, mometasone         (ASMANEX 60 METERED DOSES) 220 MCG/INH inhaler,        salmeterol (SEREVENT DISKUS) 50 MCG/DOSE         inhaler            (E78.5) Hyperlipidemia LDL goal <160  Comment:   Plan:lipids higher this year. Recd weight loss, low carb/low chol diet.    (Z23) Need for prophylactic vaccination and inoculation against influenza  Comment:   Plan: FLU VACCINE, SPLIT VIRUS, IM (QUADRIVALENT)         [35315]- >3 YRS, Vaccine Administration,         Initial [11644]                COUNSELING:  Reviewed preventive health counseling, as reflected in patient instructions    BP Readings from Last 1 Encounters:  "  03/05/19 127/83     Estimated body mass index is 30.31 kg/m  as calculated from the following:    Height as of this encounter: 1.905 m (6' 3\").    Weight as of this encounter: 110 kg (242 lb 8 oz).           reports that  has never smoked. he has never used smokeless tobacco.      Counseling Resources:  ATP IV Guidelines  Pooled Cohorts Equation Calculator  FRAX Risk Assessment  ICSI Preventive Guidelines  Dietary Guidelines for Americans, 2010  USDA's MyPlate  ASA Prophylaxis  Lung CA Screening    Silvina Harvey PA-C  [facility]  "

## 2019-03-06 ASSESSMENT — ASTHMA QUESTIONNAIRES: ACT_TOTALSCORE: 25

## 2019-06-30 DIAGNOSIS — J45.30 MILD PERSISTENT ASTHMA WITHOUT COMPLICATION: ICD-10-CM

## 2019-07-01 NOTE — TELEPHONE ENCOUNTER
Too soon.  Asmanex: Rx sent 3/5/19 for 3 inhalers with 3 refills  Serevent: Rx sent 3/5/19 for 180 inhalers with 3 refills.  Argentina Koenig RN

## 2019-07-01 NOTE — TELEPHONE ENCOUNTER
"mometasone (ASMANEX 60 METERED DOSES) 220 MCG/INH inhaler  Last Written Prescription Date:  3/5/19  Last Fill Quantity: 3,  # refills: 3   Last office visit: 3/5/2019 with prescribing provider:  Candice Miranda Office Visit:      salmeterol (SEREVENT DISKUS) 50 MCG/DOSE inhaler  Last Written Prescription Date:  3/5/19  Last Fill Quantity: 180 ,  # refills: 3   Last office visit: 3/5/2019 with prescribing provider:  Candice Miranda Office Visit:          Requested Prescriptions   Pending Prescriptions Disp Refills     mometasone (ASMANEX 60 METERED DOSES) 220 MCG/INH inhaler [Pharmacy Med Name: ASMANEX 220MCG ORAL TWSTHALER (60)]  0     Sig: INHALE 1 PUFF INTO THE LUNGS TWICE DAILY       Inhaled Steroids Protocol Passed - 6/30/2019  3:07 AM        Passed - Patient is age 12 or older        Passed - Asthma control assessment score within normal limits in last 6 months     Please review ACT score.           Passed - Medication is active on med list        Passed - Recent (6 mo) or future (30 days) visit within the authorizing provider's specialty     Patient had office visit in the last 6 months or has a visit in the next 30 days with authorizing provider or within the authorizing provider's specialty.  See \"Patient Info\" tab in inbasket, or \"Choose Columns\" in Meds & Orders section of the refill encounter.            salmeterol (SEREVENT DISKUS) 50 MCG/DOSE inhaler [Pharmacy Med Name: SEREVENT DISKUS 50MCG ORAL INH (60)]  0     Sig: INHALE 1 PUFF INTO THE LUNGS TWICE DAILY       Long-Acting Beta Agonist Inhalers Protocol  Passed - 6/30/2019  3:07 AM        Passed - Patient is age 12 or older        Passed - Asthma control assessment score within normal limits in last 6 months     Please review ACT score.           Passed - Order for Serevent, Striverdi, or Foradil and pt has steroid inhaler        Passed - Medication is active on med list        Passed - Recent (6 mo) or future (30 days) visit within the authorizing " "provider's specialty     Patient had office visit in the last 6 months or has a visit in the next 30 days with authorizing provider or within the authorizing provider's specialty.  See \"Patient Info\" tab in inbasket, or \"Choose Columns\" in Meds & Orders section of the refill encounter.              "

## 2019-12-05 ENCOUNTER — OFFICE VISIT (OUTPATIENT)
Dept: FAMILY MEDICINE | Facility: CLINIC | Age: 49
End: 2019-12-05
Payer: COMMERCIAL

## 2019-12-05 VITALS
HEIGHT: 75 IN | DIASTOLIC BLOOD PRESSURE: 82 MMHG | SYSTOLIC BLOOD PRESSURE: 128 MMHG | OXYGEN SATURATION: 98 % | TEMPERATURE: 97.8 F | WEIGHT: 251 LBS | HEART RATE: 103 BPM | BODY MASS INDEX: 31.21 KG/M2

## 2019-12-05 DIAGNOSIS — M54.2 NECK PAIN: Primary | ICD-10-CM

## 2019-12-05 DIAGNOSIS — Z23 NEED FOR PROPHYLACTIC VACCINATION AND INOCULATION AGAINST INFLUENZA: ICD-10-CM

## 2019-12-05 LAB
ERYTHROCYTE [DISTWIDTH] IN BLOOD BY AUTOMATED COUNT: 12.6 % (ref 10–15)
ERYTHROCYTE [SEDIMENTATION RATE] IN BLOOD BY WESTERGREN METHOD: 16 MM/H (ref 0–15)
HCT VFR BLD AUTO: 42.4 % (ref 40–53)
HGB BLD-MCNC: 14.7 G/DL (ref 13.3–17.7)
MCH RBC QN AUTO: 29.1 PG (ref 26.5–33)
MCHC RBC AUTO-ENTMCNC: 34.7 G/DL (ref 31.5–36.5)
MCV RBC AUTO: 84 FL (ref 78–100)
PLATELET # BLD AUTO: 194 10E9/L (ref 150–450)
RBC # BLD AUTO: 5.05 10E12/L (ref 4.4–5.9)
TSH SERPL DL<=0.005 MIU/L-ACNC: <0.01 MU/L (ref 0.4–4)
WBC # BLD AUTO: 6.5 10E9/L (ref 4–11)

## 2019-12-05 PROCEDURE — 84443 ASSAY THYROID STIM HORMONE: CPT | Performed by: PHYSICIAN ASSISTANT

## 2019-12-05 PROCEDURE — 90471 IMMUNIZATION ADMIN: CPT | Performed by: PHYSICIAN ASSISTANT

## 2019-12-05 PROCEDURE — 90686 IIV4 VACC NO PRSV 0.5 ML IM: CPT | Performed by: PHYSICIAN ASSISTANT

## 2019-12-05 PROCEDURE — 99214 OFFICE O/P EST MOD 30 MIN: CPT | Mod: 25 | Performed by: PHYSICIAN ASSISTANT

## 2019-12-05 PROCEDURE — 85652 RBC SED RATE AUTOMATED: CPT | Performed by: PHYSICIAN ASSISTANT

## 2019-12-05 PROCEDURE — 36415 COLL VENOUS BLD VENIPUNCTURE: CPT | Performed by: PHYSICIAN ASSISTANT

## 2019-12-05 PROCEDURE — 85027 COMPLETE CBC AUTOMATED: CPT | Performed by: PHYSICIAN ASSISTANT

## 2019-12-05 ASSESSMENT — MIFFLIN-ST. JEOR: SCORE: 2094.16

## 2019-12-05 NOTE — PROGRESS NOTES
HPI: 47 yo male with asthma here with neck tenderness x 2 weeks (although admits not really sure how long; maybe up to 4 weeks)  Denies typical sore throat any pain on swallowing  Advil tends to help some  Pain located in the front of the neck and neck is tender to the touch  He is feeling more tired than usual  He also feels like more out of breath with stairs but has gained weight  Has had chills, but no fever that he is aware of  Denies runny nose, nasal congestion or cough  He has been working out and doesn't get sob with that.  Goes to spin class or walks 3 miles on the treadmill.  Denies personal or FH thyroid problems    Past Medical History:   Diagnosis Date     Basal cell carcinoma, scalp/neck 2015    s/p mohs     Mild persistent asthma      Past Surgical History:   Procedure Laterality Date     TONSILLECTOMY       Social History     Tobacco Use     Smoking status: Never Smoker     Smokeless tobacco: Never Used   Substance Use Topics     Alcohol use: Yes     Alcohol/week: 0.0 standard drinks     Comment: 4-5 drinks per week     Current Outpatient Medications   Medication Sig Dispense Refill     albuterol (PROAIR HFA/PROVENTIL HFA/VENTOLIN HFA) 108 (90 Base) MCG/ACT inhaler Inhale 2 puffs into the lungs every 6 hours as needed for shortness of breath / dyspnea 3 Inhaler 3     cetirizine (ZYRTEC) 10 MG tablet Take 1 tablet (10 mg) by mouth daily       Cholecalciferol (VITAMIN D) 2000 UNITS tablet Take 1 tablet by mouth daily       mometasone (ASMANEX 60 METERED DOSES) 220 MCG/INH inhaler INHALE 1 PUFF INTO THE LUNGS TWICE DAILY 3 Inhaler 3     Multiple Vitamins-Minerals (AIRBORNE GUMMIES PO) Take by mouth daily       salmeterol (SEREVENT DISKUS) 50 MCG/DOSE inhaler INHALE 1 PUFF INTO THE LUNGS TWICE DAILY 180 Inhaler 3     triamcinolone (KENALOG) 0.1 % external cream Apply sparingly to affected area three times daily for 14 days. 30 g 0     Allergies   Allergen Reactions     No Known Allergies      FAMILY  "HISTORY NOTED AND REVIEWED  PHYSICAL EXAM:    BP (!) 131/90   Pulse 103   Temp 97.8  F (36.6  C) (Oral)   Ht 1.905 m (6' 3\")   Wt 113.9 kg (251 lb)   SpO2 98%   BMI 31.37 kg/m      Patient appears non toxic  Ears: TMs pearly grey  Eyes: normal inspection  Nose: doesn't sound congested and no drainage noted  Throat: no erythema or exudates  Neck: supple without LA or thyroid masses. He is tender over R lobe of the thyroid gland  No skin changes or erythema in that area    Assessment and Plan:     (M54.2) Neck pain  (primary encounter diagnosis)  Comment:   Plan: TSH, CBC with platelets, ESR: Erythrocyte         sedimentation rate, US Thyroid            (Z23) Need for prophylactic vaccination and inoculation against influenza  Comment:   Plan: INFLUENZA VACCINE IM > 6 MONTHS VALENT IIV4         [70276], Vaccine Administration, Initial         [09899]              Silvina Harvey PA-C          "

## 2019-12-09 NOTE — RESULT ENCOUNTER NOTE
Call pt, I talked to Dr. Aviles who recd a few other tests for his thyroid. Assist him in making a lab only appt please

## 2019-12-10 ENCOUNTER — HOSPITAL ENCOUNTER (OUTPATIENT)
Dept: ULTRASOUND IMAGING | Facility: CLINIC | Age: 49
Discharge: HOME | End: 2019-12-10
Attending: PHYSICIAN ASSISTANT | Admitting: PHYSICIAN ASSISTANT
Payer: COMMERCIAL

## 2019-12-10 DIAGNOSIS — M54.2 NECK PAIN: ICD-10-CM

## 2019-12-10 PROCEDURE — 76536 US EXAM OF HEAD AND NECK: CPT

## 2019-12-12 DIAGNOSIS — R79.89 LOW TSH LEVEL: Primary | ICD-10-CM

## 2019-12-12 DIAGNOSIS — M54.2 NECK PAIN: ICD-10-CM

## 2019-12-12 LAB
T3FREE SERPL-MCNC: 13.5 PG/ML (ref 2.3–4.2)
T4 FREE SERPL-MCNC: 4.32 NG/DL (ref 0.76–1.46)
TSH SERPL DL<=0.005 MIU/L-ACNC: <0.01 MU/L (ref 0.4–4)

## 2019-12-12 PROCEDURE — 84481 FREE ASSAY (FT-3): CPT | Performed by: PHYSICIAN ASSISTANT

## 2019-12-12 PROCEDURE — 84443 ASSAY THYROID STIM HORMONE: CPT | Performed by: PHYSICIAN ASSISTANT

## 2019-12-12 PROCEDURE — 84439 ASSAY OF FREE THYROXINE: CPT | Performed by: PHYSICIAN ASSISTANT

## 2019-12-12 PROCEDURE — 36415 COLL VENOUS BLD VENIPUNCTURE: CPT | Performed by: PHYSICIAN ASSISTANT

## 2019-12-13 ENCOUNTER — TELEPHONE (OUTPATIENT)
Dept: FAMILY MEDICINE | Facility: CLINIC | Age: 49
End: 2019-12-13

## 2019-12-13 NOTE — TELEPHONE ENCOUNTER
Patient returned call, notified of below. Number given to schedule radiology visit    Fatoumata BUTLER RN

## 2019-12-13 NOTE — RESULT ENCOUNTER NOTE
Call pt and let him know that since the tsh is still very low and the Free T3 and T4 are high, he will need a thyroid uptake scan. I ordered this and [facility] should be contacted him to get this scheduled.

## 2019-12-13 NOTE — TELEPHONE ENCOUNTER
Left message asking patient to call back     Fatoumata BUTLER RN      Result Notes for T3, Free     Notes recorded by Silvina Harvey PA-C on 12/13/2019 at 9:35 AM CST  Call pt and let him know that since the tsh is still very low and the Free T3 and T4 are high, he will need a thyroid uptake scan. I ordered this and [facility] should be contacted him to get this scheduled.

## 2019-12-19 ENCOUNTER — HOSPITAL ENCOUNTER (OUTPATIENT)
Dept: NUCLEAR MEDICINE | Facility: CLINIC | Age: 49
Setting detail: NUCLEAR MEDICINE
Discharge: HOME | End: 2019-12-19
Attending: PHYSICIAN ASSISTANT | Admitting: PHYSICIAN ASSISTANT
Payer: COMMERCIAL

## 2019-12-19 DIAGNOSIS — M54.2 NECK PAIN: ICD-10-CM

## 2019-12-19 DIAGNOSIS — R79.89 LOW TSH LEVEL: ICD-10-CM

## 2019-12-19 PROCEDURE — A9516 IODINE I-123 SOD IODIDE MIC: HCPCS | Performed by: PHYSICIAN ASSISTANT

## 2019-12-19 PROCEDURE — 78014 THYROID IMAGING W/BLOOD FLOW: CPT

## 2019-12-19 PROCEDURE — 34300033 ZZH RX 343: Performed by: PHYSICIAN ASSISTANT

## 2019-12-19 RX ADMIN — Medication 230 UCI.: at 12:28

## 2019-12-20 ENCOUNTER — HOSPITAL ENCOUNTER (OUTPATIENT)
Dept: NUCLEAR MEDICINE | Facility: CLINIC | Age: 49
Setting detail: NUCLEAR MEDICINE
End: 2019-12-20
Attending: PHYSICIAN ASSISTANT
Payer: COMMERCIAL

## 2019-12-23 DIAGNOSIS — E78.5 HYPERLIPIDEMIA LDL GOAL <160: ICD-10-CM

## 2019-12-23 DIAGNOSIS — Z00.00 ROUTINE GENERAL MEDICAL EXAMINATION AT A HEALTH CARE FACILITY: Primary | ICD-10-CM

## 2019-12-23 DIAGNOSIS — E06.1 SUBACUTE THYROIDITIS: ICD-10-CM

## 2020-02-06 ENCOUNTER — E-VISIT (OUTPATIENT)
Dept: FAMILY MEDICINE | Facility: CLINIC | Age: 50
End: 2020-02-06
Payer: COMMERCIAL

## 2020-02-06 DIAGNOSIS — J45.41 MODERATE PERSISTENT ASTHMA WITH EXACERBATION: Primary | ICD-10-CM

## 2020-02-06 PROCEDURE — 99421 OL DIG E/M SVC 5-10 MIN: CPT | Performed by: PHYSICIAN ASSISTANT

## 2020-02-06 RX ORDER — DOXYCYCLINE HYCLATE 100 MG
100 TABLET ORAL 2 TIMES DAILY
Qty: 20 TABLET | Refills: 0 | Status: SHIPPED | OUTPATIENT
Start: 2020-02-06 | End: 2020-12-30

## 2020-02-06 RX ORDER — PREDNISONE 20 MG/1
20 TABLET ORAL 2 TIMES DAILY
Qty: 10 TABLET | Refills: 0 | Status: SHIPPED | OUTPATIENT
Start: 2020-02-06 | End: 2020-12-30

## 2020-02-06 NOTE — PATIENT INSTRUCTIONS

## 2020-03-02 ENCOUNTER — TELEPHONE (OUTPATIENT)
Dept: FAMILY MEDICINE | Facility: CLINIC | Age: 50
End: 2020-03-02

## 2020-03-02 NOTE — TELEPHONE ENCOUNTER
Prior Authorization Retail Medication Request    Medication/Dose: Asmanex 220 mcg oral twisthaler  ICD code (if different than what is on RX):  J45.30  Previously Tried and Failed:  None  Rationale:  Patient stable on medication    [place]  [place] ID:  [id number]      Pharmacy Information (if different than what is on RX)  Name:  [id number]  Phone:  [phone]

## 2020-03-04 DIAGNOSIS — J45.30 MILD PERSISTENT ASTHMA WITHOUT COMPLICATION: Primary | ICD-10-CM

## 2020-03-05 NOTE — TELEPHONE ENCOUNTER
Asthmanex switched to Flovent per insurance formulary and agreement with PCP. Patient called and picked up medication last night and informed that if there are any side effects to contact us in the clinic and we can appeal to the insurance company. Patient is agreeable with this plan.    Ethan Márquez, DIAMOND on 3/5/2020 at 10:59 AM

## 2020-03-05 NOTE — TELEPHONE ENCOUNTER
According to chart notes Asmanex has been discontinued and Flovent has been prescribed. If a PA is still needed for Asmanex please route back to PA team. Thank you.

## 2020-03-06 DIAGNOSIS — E06.1 SUBACUTE THYROIDITIS: ICD-10-CM

## 2020-03-06 DIAGNOSIS — E78.5 HYPERLIPIDEMIA LDL GOAL <160: ICD-10-CM

## 2020-03-06 DIAGNOSIS — Z00.00 ROUTINE GENERAL MEDICAL EXAMINATION AT A HEALTH CARE FACILITY: ICD-10-CM

## 2020-03-06 LAB
ALBUMIN SERPL-MCNC: 3.7 G/DL (ref 3.4–5)
ALP SERPL-CCNC: 71 U/L (ref 40–150)
ALT SERPL W P-5'-P-CCNC: 130 U/L (ref 0–70)
ANION GAP SERPL CALCULATED.3IONS-SCNC: 7 MMOL/L (ref 3–14)
AST SERPL W P-5'-P-CCNC: 46 U/L (ref 0–45)
BILIRUB SERPL-MCNC: 0.4 MG/DL (ref 0.2–1.3)
BUN SERPL-MCNC: 10 MG/DL (ref 7–30)
CALCIUM SERPL-MCNC: 9.1 MG/DL (ref 8.5–10.1)
CHLORIDE SERPL-SCNC: 107 MMOL/L (ref 94–109)
CHOLEST SERPL-MCNC: 187 MG/DL
CO2 SERPL-SCNC: 25 MMOL/L (ref 20–32)
CREAT SERPL-MCNC: 0.92 MG/DL (ref 0.66–1.25)
ERYTHROCYTE [DISTWIDTH] IN BLOOD BY AUTOMATED COUNT: 14.6 % (ref 10–15)
GFR SERPL CREATININE-BSD FRML MDRD: >90 ML/MIN/{1.73_M2}
GLUCOSE SERPL-MCNC: 110 MG/DL (ref 70–99)
HCT VFR BLD AUTO: 43.4 % (ref 40–53)
HDLC SERPL-MCNC: 37 MG/DL
HGB BLD-MCNC: 15 G/DL (ref 13.3–17.7)
LDLC SERPL CALC-MCNC: 123 MG/DL
MCH RBC QN AUTO: 28.2 PG (ref 26.5–33)
MCHC RBC AUTO-ENTMCNC: 34.6 G/DL (ref 31.5–36.5)
MCV RBC AUTO: 82 FL (ref 78–100)
NONHDLC SERPL-MCNC: 150 MG/DL
PLATELET # BLD AUTO: 201 10E9/L (ref 150–450)
POTASSIUM SERPL-SCNC: 4.3 MMOL/L (ref 3.4–5.3)
PROT SERPL-MCNC: 7.1 G/DL (ref 6.8–8.8)
RBC # BLD AUTO: 5.31 10E12/L (ref 4.4–5.9)
SODIUM SERPL-SCNC: 139 MMOL/L (ref 133–144)
TRIGL SERPL-MCNC: 135 MG/DL
TSH SERPL DL<=0.005 MIU/L-ACNC: 3.98 MU/L (ref 0.4–4)
WBC # BLD AUTO: 5.9 10E9/L (ref 4–11)

## 2020-03-06 PROCEDURE — 85027 COMPLETE CBC AUTOMATED: CPT | Performed by: PHYSICIAN ASSISTANT

## 2020-03-06 PROCEDURE — 36415 COLL VENOUS BLD VENIPUNCTURE: CPT | Performed by: PHYSICIAN ASSISTANT

## 2020-03-06 PROCEDURE — 84443 ASSAY THYROID STIM HORMONE: CPT | Performed by: PHYSICIAN ASSISTANT

## 2020-03-06 PROCEDURE — 80053 COMPREHEN METABOLIC PANEL: CPT | Performed by: PHYSICIAN ASSISTANT

## 2020-03-06 PROCEDURE — 80061 LIPID PANEL: CPT | Performed by: PHYSICIAN ASSISTANT

## 2020-03-10 ENCOUNTER — OFFICE VISIT (OUTPATIENT)
Dept: FAMILY MEDICINE | Facility: CLINIC | Age: 50
End: 2020-03-10
Payer: COMMERCIAL

## 2020-03-10 VITALS
OXYGEN SATURATION: 98 % | TEMPERATURE: 98.9 F | DIASTOLIC BLOOD PRESSURE: 87 MMHG | WEIGHT: 256 LBS | HEIGHT: 75 IN | HEART RATE: 89 BPM | BODY MASS INDEX: 31.83 KG/M2 | SYSTOLIC BLOOD PRESSURE: 125 MMHG

## 2020-03-10 DIAGNOSIS — Z00.00 ROUTINE GENERAL MEDICAL EXAMINATION AT A HEALTH CARE FACILITY: Primary | ICD-10-CM

## 2020-03-10 DIAGNOSIS — R79.89 ELEVATED LFTS: ICD-10-CM

## 2020-03-10 DIAGNOSIS — R73.9 HYPERGLYCEMIA: ICD-10-CM

## 2020-03-10 DIAGNOSIS — Z86.19 HISTORY OF TRAVELER'S DIARRHEA: ICD-10-CM

## 2020-03-10 DIAGNOSIS — J45.30 MILD PERSISTENT ASTHMA WITHOUT COMPLICATION: ICD-10-CM

## 2020-03-10 DIAGNOSIS — B35.6 TINEA CRURIS: ICD-10-CM

## 2020-03-10 PROCEDURE — 99396 PREV VISIT EST AGE 40-64: CPT | Performed by: PHYSICIAN ASSISTANT

## 2020-03-10 RX ORDER — TRIAMCINOLONE ACETONIDE 1 MG/G
CREAM TOPICAL
Qty: 30 G | Refills: 1 | Status: SHIPPED | OUTPATIENT
Start: 2020-03-10 | End: 2021-04-28

## 2020-03-10 RX ORDER — PREDNISONE 20 MG/1
20 TABLET ORAL 2 TIMES DAILY
Qty: 10 TABLET | Refills: 0 | Status: SHIPPED | OUTPATIENT
Start: 2020-03-10 | End: 2021-04-28

## 2020-03-10 RX ORDER — ALBUTEROL SULFATE 90 UG/1
2 AEROSOL, METERED RESPIRATORY (INHALATION) EVERY 6 HOURS PRN
Qty: 3 INHALER | Refills: 3 | Status: SHIPPED | OUTPATIENT
Start: 2020-03-10 | End: 2022-03-18

## 2020-03-10 RX ORDER — CIPROFLOXACIN 500 MG/1
500 TABLET, FILM COATED ORAL 2 TIMES DAILY
Qty: 6 TABLET | Refills: 0 | Status: SHIPPED | OUTPATIENT
Start: 2020-03-10 | End: 2020-12-30

## 2020-03-10 ASSESSMENT — MIFFLIN-ST. JEOR: SCORE: 2111.84

## 2020-03-10 NOTE — PROGRESS NOTES
"SUBJECTIVE:   CC: Bobby Alexandre is an 49 year old male who presents for preventative health visit.     Liver functions up but had \"stomach flu\" last week  He had hyperthyroidism last year which resolved and now tsh back to normal    Pt plans to travel to [place] next week and would like to bring prednisone (in case of asthma flare) and antibiotic (in case of diarrhea)    TSH   Date Value Ref Range Status   03/06/2020 3.98 0.40 - 4.00 mU/L Final         Healthy Habits:     Getting at least 3 servings of Calcium per day:  NO    Bi-annual eye exam:  Yes    Dental care twice a year:  Yes    Sleep apnea or symptoms of sleep apnea:  Excessive snoring    Diet:  Regular (no restrictions)    Frequency of exercise:  2-3 days/week    Duration of exercise:  30-45 minutes    Taking medications regularly:  No    Barriers to taking medications:  None    Medication side effects:  None    PHQ-2 Total Score: 0    Additional concerns today:  No      Today's PHQ-2 Score:   PHQ-2 ( 1999 Pfizer) 3/10/2020   Q1: Little interest or pleasure in doing things 0   Q2: Feeling down, depressed or hopeless 0   PHQ-2 Score 0       Abuse: Current or Past(Physical, Sexual or Emotional)- No  Do you feel safe in your environment? Yes        Social History     Tobacco Use     Smoking status: Never Smoker     Smokeless tobacco: Never Used   Substance Use Topics     Alcohol use: Yes     Alcohol/week: 0.0 standard drinks     Comment: 4-5 drinks per week     If you drink alcohol do you typically have >3 drinks per day or >7 drinks per week? No    Alcohol Use 3/10/2020   Prescreen: >3 drinks/day or >7 drinks/week? No     Reviewed orders with patient. Reviewed health maintenance and updated orders accordingly - Yes      Reviewed and updated as needed this visit by clinical staff  Tobacco  Allergies  Meds         Reviewed and updated as needed this visit by Provider        Past Medical History:   Diagnosis Date     Basal cell carcinoma, scalp/neck " 2015    s/p mohs     Mild persistent asthma      Past Surgical History:   Procedure Laterality Date     TONSILLECTOMY       Current Outpatient Medications   Medication Sig Dispense Refill     albuterol (PROAIR HFA/PROVENTIL HFA/VENTOLIN HFA) 108 (90 Base) MCG/ACT inhaler Inhale 2 puffs into the lungs every 6 hours as needed for shortness of breath / dyspnea 3 Inhaler 3     cetirizine (ZYRTEC) 10 MG tablet Take 1 tablet (10 mg) by mouth daily       Cholecalciferol (VITAMIN D) 2000 UNITS tablet Take 1 tablet by mouth daily       ciprofloxacin (CIPRO) 500 MG tablet Take 1 tablet (500 mg) by mouth 2 times daily 6 tablet 0     doxycycline hyclate (VIBRA-TABS) 100 MG tablet Take 1 tablet (100 mg) by mouth 2 times daily 20 tablet 0     fluticasone (FLOVENT DISKUS) 100 MCG/BLIST inhaler Inhale 1 puff into the lungs every 12 hours 180 each 3     Multiple Vitamins-Minerals (AIRBORNE GUMMIES PO) Take by mouth daily       predniSONE (DELTASONE) 20 MG tablet Take 1 tablet (20 mg) by mouth 2 times daily 10 tablet 0     predniSONE (DELTASONE) 20 MG tablet Take 1 tablet (20 mg) by mouth 2 times daily 10 tablet 0     salmeterol (SEREVENT DISKUS) 50 MCG/DOSE inhaler INHALE 1 PUFF INTO THE LUNGS TWICE DAILY 180 Inhaler 3     triamcinolone (KENALOG) 0.1 % external cream Apply sparingly to affected area three times daily for 14 days. 30 g 1         Review of Systems  CONSTITUTIONAL: NEGATIVE for fever, chills, change in weight  INTEGUMENTARY/SKIN: NEGATIVE for worrisome rashes, moles or lesions  EYES: NEGATIVE for vision changes or irritation  ENT: NEGATIVE for ear, mouth and throat problems  RESP: NEGATIVE for significant cough or SOB  CV: NEGATIVE for chest pain, palpitations or peripheral edema  GI: NEGATIVE for nausea, abdominal pain, heartburn, or change in bowel habits   male: negative for dysuria, hematuria, decreased urinary stream, erectile dysfunction, urethral discharge  MUSCULOSKELETAL: NEGATIVE for significant arthralgias  "or myalgia  NEURO: NEGATIVE for weakness, dizziness or paresthesias  PSYCHIATRIC: NEGATIVE for changes in mood or affect    OBJECTIVE:   /87 (BP Location: Right arm, Patient Position: Sitting, Cuff Size: Adult Large)   Pulse 89   Temp 98.9  F (37.2  C) (Tympanic)   Ht 1.905 m (6' 3\")   Wt 116.1 kg (256 lb)   SpO2 98%   BMI 32.00 kg/m      Physical Exam  GENERAL: healthy, alert and no distress  EYES: Eyes grossly normal to inspection, PERRL and conjunctivae and sclerae normal  HENT: ear canals and TM's normal, nose and mouth without ulcers or lesions  NECK: no adenopathy, no asymmetry, masses, or scars and thyroid normal to palpation  RESP: lungs clear to auscultation - no rales, rhonchi or wheezes  CV: regular rate and rhythm, normal S1 S2, no S3 or S4, no murmur, click or rub, no peripheral edema and peripheral pulses strong  ABDOMEN: soft, nontender, no hepatosplenomegaly, no masses and bowel sounds normal  MS: no gross musculoskeletal defects noted, no edema  SKIN: no suspicious lesions or rashes  NEURO: Normal strength and tone, mentation intact and speech normal  PSYCH: mentation appears normal, affect normal/bright    Last Comprehensive Metabolic Panel:  Sodium   Date Value Ref Range Status   03/06/2020 139 133 - 144 mmol/L Final     Potassium   Date Value Ref Range Status   03/06/2020 4.3 3.4 - 5.3 mmol/L Final     Chloride   Date Value Ref Range Status   03/06/2020 107 94 - 109 mmol/L Final     Carbon Dioxide   Date Value Ref Range Status   03/06/2020 25 20 - 32 mmol/L Final     Anion Gap   Date Value Ref Range Status   03/06/2020 7 3 - 14 mmol/L Final     Glucose   Date Value Ref Range Status   03/06/2020 110 (H) 70 - 99 mg/dL Final     Urea Nitrogen   Date Value Ref Range Status   03/06/2020 10 7 - 30 mg/dL Final     Creatinine   Date Value Ref Range Status   03/06/2020 0.92 0.66 - 1.25 mg/dL Final     GFR Estimate   Date Value Ref Range Status   03/06/2020 >90 >60 mL/min/[1.73_m2] Final     " "Comment:     Non  GFR Calc  Starting 12/18/2018, serum creatinine based estimated GFR (eGFR) will be   calculated using the Chronic Kidney Disease Epidemiology Collaboration   (CKD-EPI) equation.       Calcium   Date Value Ref Range Status   03/06/2020 9.1 8.5 - 10.1 mg/dL Final     Bilirubin Total   Date Value Ref Range Status   03/06/2020 0.4 0.2 - 1.3 mg/dL Final     Alkaline Phosphatase   Date Value Ref Range Status   03/06/2020 71 40 - 150 U/L Final     ALT   Date Value Ref Range Status   03/06/2020 130 (H) 0 - 70 U/L Final     AST   Date Value Ref Range Status   03/06/2020 46 (H) 0 - 45 U/L Final         ASSESSMENT/PLAN:   Assessment and Plan:     (Z00.00) Routine general medical examination at a health care facility  (primary encounter diagnosis)  Comment:   Plan: previsit labs discussed with pt.  His weight is up this year    (J45.30) Mild persistent asthma without complication  Comment: insurance changed so his steroid inh switched to flovent and tolerates fine  Plan: salmeterol (SEREVENT DISKUS) 50 MCG/DOSE         inhaler, fluticasone (FLOVENT DISKUS) 100         MCG/BLIST inhaler, albuterol (PROAIR         HFA/PROVENTIL HFA/VENTOLIN HFA) 108 (90 Base)         MCG/ACT inhaler, predniSONE (DELTASONE) 20 MG         tablet        Pred given for upcoming trip to [place]    (B35.6) Tinea cruris  Comment: he mixes kenalog with antifungal cream infreq  Plan: triamcinolone (KENALOG) 0.1 % external cream            (Z86.19) History of traveler's diarrhea  Comment:   Plan: ciprofloxacin (CIPRO) 500 MG tablet            (R94.5) Elevated LFTs  Comment:   Plan: **Hepatic panel FUTURE 2mo            (R73.9) Hyperglycemia  Comment:   Plan: **Glucose FUTURE 2mo                COUNSELING:   Reviewed preventive health counseling, as reflected in patient instructions    Estimated body mass index is 32 kg/m  as calculated from the following:    Height as of this encounter: 1.905 m (6' 3\").    Weight as of this " encounter: 116.1 kg (256 lb).          reports that he has never smoked. He has never used smokeless tobacco.      Counseling Resources:  ATP IV Guidelines  Pooled Cohorts Equation Calculator  FRAX Risk Assessment  ICSI Preventive Guidelines  Dietary Guidelines for Americans, 2010  USDA's MyPlate  ASA Prophylaxis  Lung CA Screening    Silvina Harvey PA-C  [facility]

## 2020-03-11 ASSESSMENT — ASTHMA QUESTIONNAIRES: ACT_TOTALSCORE: 25

## 2020-03-25 ENCOUNTER — TELEPHONE (OUTPATIENT)
Dept: FAMILY MEDICINE | Facility: CLINIC | Age: 50
End: 2020-03-25

## 2020-03-25 DIAGNOSIS — J45.30 MILD PERSISTENT ASTHMA WITHOUT COMPLICATION: Primary | ICD-10-CM

## 2020-03-25 NOTE — TELEPHONE ENCOUNTER
(copied from recent OV)    J45.30) Mild persistent asthma without complication  Comment: insurance changed so his steroid inh switched to flovent and tolerates fine  Plan: salmeterol (SEREVENT DISKUS) 50 MCG/DOSE         inhaler,   fluticasone (FLOVENT DISKUS) 100         MCG/BLIST inhaler,   albuterol (PROAIR         HFA/PROVENTIL HFA/VENTOLIN HFA) 108 (90 Base)         MCG/ACT inhaler,   predniSONE (DELTASONE) 20 MG         tablet  -------    See below.  Wanting to switch back to Asthmanex instead of Flovent OR something else?  ? Start a prior auth.for Asthmanex    Janet Cueva RN on 3/25/2020 at 3:49 PM

## 2020-03-25 NOTE — TELEPHONE ENCOUNTER
Reason for Call:  Other prescription    Detailed comments:   Pt was rx'd this medication  fluticasone (FLOVENT DISKUS) 100 MCG/BLIST inhaler  180 each  3  3/10/2020   No    Sig - Route: Inhale 1 puff into the lungs every 12 hours - Inhalation    Sent to pharmacy as: fluticasone (FLOVENT DISKUS) 100 MCG/BLIST inhaler    Class: E-Prescribe    Order: [id number]    E-Prescribing Status: Receipt confirmed by pharmacy (3/10/2020  3:46 PM CDT)        He has history of taking asthmanex.  Asthmanex was successful, however insurance must have changed and it required a PA.  Flovent was tried.  Pt has had it for about a month and he is experiencing non improving symptoms.  He feels that the previous rx of asthmanext was much more efficient.  Pt is looking for alternative or possibly placing PA.  He is leaning more towards the PA.     Phone Number Patient can be reached at: Home number on file [phone] (home)    Best Time: Any     Can we leave a detailed message on this number? YES    Call taken on 3/25/2020 at 2:47 PM by Amanda Burkett

## 2020-03-26 NOTE — TELEPHONE ENCOUNTER
Called Pt:    No answer, left VM with a details and to call back with any further questions or concerns, especially if he does not see improvement on Pulmicort       Maura FINNEGAN RN

## 2020-12-30 ENCOUNTER — TELEPHONE (OUTPATIENT)
Dept: FAMILY MEDICINE | Facility: CLINIC | Age: 50
End: 2020-12-30

## 2020-12-30 NOTE — TELEPHONE ENCOUNTER
Regrettably, he actually does not meet criteria for this based on the order set. The specific criteria is listed and he doesn't meet any of it, even with asthma. Please inform the pt.   RUPA Hooper CNP

## 2020-12-30 NOTE — TELEPHONE ENCOUNTER
Reason for Call:  Other call back    Detailed comments: patientl is calling because he states he tested positive for covid and would like to discuss antibody study. Please follow up with patient.     Phone Number Patient can be reached at: Cell number on file:    Telephone Information:   Mobile [phone]       Best Time: anytime     Can we leave a detailed message on this number? YES    Call taken on 12/30/2020 at 12:57 PM by Macy Espinal

## 2020-12-30 NOTE — TELEPHONE ENCOUNTER
I called the patient and he had rec'd information the [address] regarding the referral I have pended for you.  HE would like to be referred for this.  Please sign.   Thalia Jamil MA

## 2021-01-15 ENCOUNTER — HEALTH MAINTENANCE LETTER (OUTPATIENT)
Age: 51
End: 2021-01-15

## 2021-03-09 DIAGNOSIS — J45.30 MILD PERSISTENT ASTHMA WITHOUT COMPLICATION: ICD-10-CM

## 2021-03-09 NOTE — TELEPHONE ENCOUNTER
LOV 3-   No future OV scheduled    SIG/Pharm note given  Patient does not use his MyC    Farida Campo, RT (R)

## 2021-04-28 ENCOUNTER — OFFICE VISIT (OUTPATIENT)
Dept: FAMILY MEDICINE | Facility: CLINIC | Age: 51
End: 2021-04-28
Payer: COMMERCIAL

## 2021-04-28 VITALS
TEMPERATURE: 97 F | BODY MASS INDEX: 31.21 KG/M2 | WEIGHT: 251 LBS | DIASTOLIC BLOOD PRESSURE: 86 MMHG | HEART RATE: 98 BPM | HEIGHT: 75 IN | SYSTOLIC BLOOD PRESSURE: 124 MMHG | OXYGEN SATURATION: 100 %

## 2021-04-28 DIAGNOSIS — J45.30 MILD PERSISTENT ASTHMA WITHOUT COMPLICATION: Primary | ICD-10-CM

## 2021-04-28 DIAGNOSIS — Z12.11 COLON CANCER SCREENING: ICD-10-CM

## 2021-04-28 DIAGNOSIS — B35.6 TINEA CRURIS: ICD-10-CM

## 2021-04-28 DIAGNOSIS — F41.9 ANXIETY: ICD-10-CM

## 2021-04-28 PROCEDURE — 99214 OFFICE O/P EST MOD 30 MIN: CPT | Performed by: PHYSICIAN ASSISTANT

## 2021-04-28 RX ORDER — TRIAMCINOLONE ACETONIDE 1 MG/G
CREAM TOPICAL
Qty: 30 G | Refills: 1 | Status: SHIPPED | OUTPATIENT
Start: 2021-04-28 | End: 2022-11-11

## 2021-04-28 ASSESSMENT — MIFFLIN-ST. JEOR: SCORE: 2084.16

## 2021-04-28 NOTE — PROGRESS NOTES
HPI: Geo is a 49 yo male here for f/u asthma  Pt had COVID in Dec and improved, but still not 100%  Since then he has some tightness in the chest and now needs to use his rescue inhaler a few times per week.  If he exercises then he may also need to use his inhaler  Works for [employer]  He did get monoclonal antibodies  He had mild symptoms.  He did get his covid vaccine earlier this month    Pt having some anxiety at work   He has trouble shutting off his brain  Wants to discuss treatment options  Denies depression, feeling down or SI  Denies panic attacks  Admits to drinking a lot of coffee (3-4 per day)  Drinks etoh a few nights per week  Hasn't been exercising as much as he'd like      Past Medical History:   Diagnosis Date     Basal cell carcinoma, scalp/neck 2015    s/p mohs     Mild persistent asthma      Past Surgical History:   Procedure Laterality Date     TONSILLECTOMY       Social History     Tobacco Use     Smoking status: Never Smoker     Smokeless tobacco: Never Used   Substance Use Topics     Alcohol use: Yes     Alcohol/week: 0.0 standard drinks     Comment: 4-5 drinks per week     Current Outpatient Medications   Medication Sig Dispense Refill     albuterol (PROAIR HFA/PROVENTIL HFA/VENTOLIN HFA) 108 (90 Base) MCG/ACT inhaler Inhale 2 puffs into the lungs every 6 hours as needed for shortness of breath / dyspnea 3 Inhaler 3     budesonide (PULMICORT FLEXHALER) 180 MCG/ACT inhaler Inhale 1 puff into the lungs 2 times daily 1 each 11     cetirizine (ZYRTEC) 10 MG tablet Take 1 tablet (10 mg) by mouth daily       Cholecalciferol (VITAMIN D) 2000 UNITS tablet Take 1 tablet by mouth daily       Multiple Vitamins-Minerals (AIRBORNE GUMMIES PO) Take by mouth daily       predniSONE (DELTASONE) 20 MG tablet Take 1 tablet (20 mg) by mouth 2 times daily 10 tablet 0     salmeterol (SEREVENT DISKUS) 50 MCG/DOSE inhaler INHALE 1 PUFF INTO THE LUNGS TWICE DAILY - fasting annual visit (in clinic) due  "for further refills 60 each 0     triamcinolone (KENALOG) 0.1 % external cream Apply sparingly to affected area three times daily for 14 days. 30 g 1     Allergies   Allergen Reactions     No Known Allergies      FAMILY HISTORY NOTED AND REVIEWED    PHYSICAL EXAM:    /86 (BP Location: Right arm, Patient Position: Chair, Cuff Size: Adult Large)   Pulse 98   Temp 97  F (36.1  C) (Temporal)   Ht 1.905 m (6' 3\")   Wt 113.9 kg (251 lb)   SpO2 100%   BMI 31.37 kg/m      Patient appears non toxic  Lungs: faint wheeze heard on forced expirations.  Heart: RRR    Assessment and Plan:             (J45.30) Mild persistent asthma without complication  Comment: Pt is using his albuterol inh a few times per week since having covid in Dec.  He is improving and wheezing does resolve with use of albuterol.  Plan: budesonide (PULMICORT FLEXHALER) 180 MCG/ACT         inhaler, salmeterol (SEREVENT DISKUS) 50         MCG/DOSE inhaler        Refills given    (B35.6) Tinea cruris  Comment: he mixes this steroid cream with an antifungal prn  Plan: triamcinolone (KENALOG) 0.1 % external cream            (F41.9) Anxiety  Comment: new diagnosis, mostly work related  Plan: start sertraline (ZOLOFT) 50 MG tablet. Take 1/2 tab for one week in the morning, then increase to 50mg every day after that. Return for f/u 4-6 weeks. Come fasting as also due for labs.             (Z12.11) Colon cancer screening  (primary encounter diagnosis)  Comment: his first colonoscopy is due  Plan: GASTROENTEROLOGY ADULT REF PROCEDURE ONLY    Silvina Harvey PA-C          "

## 2021-04-29 ASSESSMENT — ASTHMA QUESTIONNAIRES: ACT_TOTALSCORE: 25

## 2021-05-15 ENCOUNTER — HEALTH MAINTENANCE LETTER (OUTPATIENT)
Age: 51
End: 2021-05-15

## 2021-07-28 ENCOUNTER — E-VISIT (OUTPATIENT)
Dept: FAMILY MEDICINE | Facility: CLINIC | Age: 51
End: 2021-07-28
Payer: COMMERCIAL

## 2021-07-28 DIAGNOSIS — J45.30 MILD PERSISTENT ASTHMA WITHOUT COMPLICATION: ICD-10-CM

## 2021-07-28 DIAGNOSIS — J45.41 MODERATE PERSISTENT ASTHMA WITH EXACERBATION: Primary | ICD-10-CM

## 2021-07-28 PROCEDURE — 99421 OL DIG E/M SVC 5-10 MIN: CPT | Performed by: PHYSICIAN ASSISTANT

## 2021-07-28 RX ORDER — PREDNISONE 20 MG/1
20 TABLET ORAL 2 TIMES DAILY
Qty: 10 TABLET | Refills: 0 | Status: SHIPPED | OUTPATIENT
Start: 2021-07-28 | End: 2021-12-29

## 2021-09-04 ENCOUNTER — HEALTH MAINTENANCE LETTER (OUTPATIENT)
Age: 51
End: 2021-09-04

## 2021-11-12 ENCOUNTER — E-VISIT (OUTPATIENT)
Dept: FAMILY MEDICINE | Facility: CLINIC | Age: 51
End: 2021-11-12
Payer: COMMERCIAL

## 2021-11-12 DIAGNOSIS — J01.90 ACUTE BACTERIAL SINUSITIS: Primary | ICD-10-CM

## 2021-11-12 DIAGNOSIS — B96.89 ACUTE BACTERIAL SINUSITIS: Primary | ICD-10-CM

## 2021-11-12 DIAGNOSIS — J45.41 MODERATE PERSISTENT ASTHMA WITH EXACERBATION: ICD-10-CM

## 2021-11-12 PROCEDURE — 99421 OL DIG E/M SVC 5-10 MIN: CPT | Performed by: PHYSICIAN ASSISTANT

## 2021-11-15 RX ORDER — PREDNISONE 20 MG/1
20 TABLET ORAL 2 TIMES DAILY
Qty: 10 TABLET | Refills: 0 | Status: SHIPPED | OUTPATIENT
Start: 2021-11-15 | End: 2021-12-29

## 2021-11-15 NOTE — PATIENT INSTRUCTIONS
Dear Bobby Alexandre    After reviewing your responses, I've been able to diagnose you with?a sinus infection caused by bacteria.?     Based on your responses and diagnosis, I have prescribed augmentin and prednisone to treat your symptoms. I have sent this to your pharmacy.?     It is also important to stay well hydrated, get lots of rest and take over-the-counter decongestants,?tylenol?or ibuprofen if you?are able to?take those medications per your primary care provider to help relieve discomfort.?     It is important that you take?all of?your prescribed medication even if your symptoms are improving after a few doses.? Taking?all of?your medicine helps prevent the symptoms from returning.?     If your symptoms worsen, you develop severe headache, vomiting, high fever (>102), or are not improving in 7 days, please contact your primary care provider for an appointment or visit any of our convenient Walk-in Care or Urgent Care Centers to be seen which can be found on our website?here.?     Thanks again for choosing?us?as your health care partner,?   ?  Silvina Harvey PA-C?

## 2021-12-09 ENCOUNTER — TELEPHONE (OUTPATIENT)
Dept: GASTROENTEROLOGY | Facility: CLINIC | Age: 51
End: 2021-12-09
Payer: COMMERCIAL

## 2021-12-09 DIAGNOSIS — Z11.59 ENCOUNTER FOR SCREENING FOR OTHER VIRAL DISEASES: ICD-10-CM

## 2021-12-09 NOTE — TELEPHONE ENCOUNTER
Screening Questions  1. Are you active on mychart? YES    2. What insurance is in the chart? [employer]     2.  Ordering/Referring Provider: Silvina Harvey PA-C    3. BMI 31.9, If greater than 40 review exclusion criteria    4.  Respiratory Screening (If yes to any of the following Hospital setting only):     Do you use daily home oxygen? NO  Do you have mod to severe Obstructive Sleep Apnea? NO   Do you have Pulmonary Hypertension? NO   Do you have UNCONTROLLED asthma? NO    5. Have you had a heart or lung transplant (If yes, please review exclusion criteria) ? NO    6. Are you currently on dialysis or have chronic kidney disease? NO    7. Have you had a stroke or Transient ischemic attack (TIA) within 6 months? NO    8. In the past 6 months, have you had any heart related issues including cardiomyopathy or heart attack? NO                 If yes, did it require cardiac stenting or other implantable device?NO      9. Do you have any implantable devices in your body (pacemaker, defib, LVAD)? NO    10. Do you take nitroglycerin? If yes, how often? NO    11. Are you currently taking any blood thinners?NO    12. Are you a diabetic? NO    13. (Females) Are you currently pregnant? NA  If yes, how many weeks?      15. Are you taking any prescription pain medications on a routine schedule? NO If yes, MAC sedation.    16. Do you have any chemical dependencies such as alcohol, street drugs, or methadone? NOIf yes, MAC sedation.    17. Do you have any history of post-traumatic stress syndrome, severe anxiety or history of psychosis? NO    18. Do you transfer independently? YES    19.  Do you have any issues with constipation? NO    20. Preferred Pharmacy for Pre Prescription [facility] IN [place]    Scheduling Details    Which Colonoscopy Prep was Sent?: SPLIT M   Procedure Scheduled: COLON  Surgeon: LILY  Date of Procedure: 1/3/2022  Location:   Caller (Please ask for phone number if not scheduled by patient):  LOC      Sedation Type: CS  Conscious Sedation- Needs  for 6 hours after the procedure  MAC/General-Needs  for 24 hours after procedure    Pre-op Required at [facility] [place], [place] and OR for MAC sedation:   (if yes advise patient they will need a pre-op prior to procedure)      Is patient on blood thinners? -NO (If yes- inform patient to follow up with PCP or provider for follow up instructions)     Informed patient they will need an adult  YES  Cannot take any type of public or medical transportation alone    Pre-Procedure Covid test to be completed at [facility] or Externally: [facility] 12/30 @ 9:30AM     Confirmed Nurse will call to complete assessment YES    Additional comments:

## 2021-12-16 ENCOUNTER — IMMUNIZATION (OUTPATIENT)
Dept: NURSING | Facility: CLINIC | Age: 51
End: 2021-12-16
Payer: COMMERCIAL

## 2021-12-16 PROCEDURE — 0064A COVID-19,PF,MODERNA (18+ YRS BOOSTER .25ML): CPT

## 2021-12-16 PROCEDURE — 91306 COVID-19,PF,MODERNA (18+ YRS BOOSTER .25ML): CPT

## 2021-12-28 ENCOUNTER — NURSE TRIAGE (OUTPATIENT)
Dept: FAMILY MEDICINE | Facility: CLINIC | Age: 51
End: 2021-12-28
Payer: COMMERCIAL

## 2021-12-28 NOTE — TELEPHONE ENCOUNTER
"Pt calling to report a hx of chronic asthma which was \"100% controlled with inhalers.\"  Had COVID ~ one year ago and reports trouble since, slowly but steadily worsening over that time.      Reports \"increased tightness\" like a \"band around my upper chest\" for the last 6 weeks as well as new \"tingling\" in his fingers and toes, which is \"mostly constant.\"  Reports a mildly productive cough but does not cough during our conversation, speaking comfortably in full sentences.  Denies fever. Was tx for asthma exacerbation in July and sinus infection in Nov, found prednisone and abx helpful but is worsening again.      Describes that he often feels he needs to \"sit straight up,\" can walk on his treadmill for 2.5 miles but \"can't push it,\" and that he is \"exhausted\" at times just walking up the stairs.  Uses his rescue inhaler \"multiple times per day\" and has \"never needed it this often before.\"  Does not feel he is wheezing.    Scheduled a virtual appt for pt tomorrow at his primary clinic to which pt is amenable.      Thalia Yadav RN  [facility]      Reason for Disposition    Longstanding difficulty breathing (e.g., CHF, COPD, emphysema) and worse than normal    MILD difficulty breathing (e.g., minimal/no SOB at rest, SOB with walking, pulse < 100) of new onset or worse than normal    Additional Information    Negative: Breathing stopped and hasn't returned    Negative: Choking on something    Negative: SEVERE difficulty breathing (e.g., struggling for each breath, speaks in single words, pulse > 120)    Negative: Bluish (or gray) lips or face    Negative: Difficult to awaken or acting confused (e.g., disoriented, slurred speech)    Negative: Passed out (i.e., fainted, collapsed and was not responding)    Negative: Wheezing started suddenly after medicine, an allergic food, or bee sting    Negative: Stridor    Negative: Slow, shallow and weak breathing    Negative: Sounds like a " "life-threatening emergency to the triager    Negative: Chest pain    Negative: Wheezing (high pitched whistling sound) and previous asthma attacks or use of asthma medicines    Negative: Difficulty breathing and only present when coughing    Negative: Difficulty breathing and only from stuffy or runny nose    Negative: Difficulty breathing and within 14 days of COVID-19 Exposure    Negative: MODERATE difficulty breathing (e.g., speaks in phrases, SOB even at rest, pulse 100-120) of new onset or worse than normal    Negative: Wheezing can be heard across the room    Negative: Drooling or spitting out saliva (because can't swallow)    Negative: Any history of prior \"blood clot\" in leg or lungs    Negative: Illness requiring prolonged bedrest in past month (e.g., immobilization, long hospital stay)    Negative: Hip or leg fracture (broken bone) in past month (or had cast on leg or ankle in past month)    Negative: Major surgery in the past month    Negative: Long-distance travel in past month (e.g., car, bus, train, plane; with trip lasting 6 or more hours)    Negative: Extra heart beats OR irregular heart beating   (i.e., \"palpitations\")    Negative: Fever > 103 F (39.4 C)    Negative: Fever > 101 F (38.3 C) and over 60 years of age    Negative: Fever > 100.0 F (37.8 C) and bedridden (e.g., nursing home patient, stroke, chronic illness, recovering from surgery)    Negative: Fever > 100.0 F (37.8 C) and diabetes mellitus or weak immune system (e.g., HIV positive, cancer chemo, splenectomy, organ transplant, chronic steroids)    Negative: Periods where breathing stops and then resumes normally and bedridden (e.g., nursing home patient, CVA)    Negative: Pregnant or postpartum (from 0 to 6 weeks after delivery)    Negative: Patient sounds very sick or weak to the triager    Protocols used: BREATHING DIFFICULTY-A-OH      "

## 2021-12-29 ENCOUNTER — OFFICE VISIT (OUTPATIENT)
Dept: FAMILY MEDICINE | Facility: CLINIC | Age: 51
End: 2021-12-29
Payer: COMMERCIAL

## 2021-12-29 ENCOUNTER — ANCILLARY PROCEDURE (OUTPATIENT)
Dept: GENERAL RADIOLOGY | Facility: CLINIC | Age: 51
End: 2021-12-29
Attending: INTERNAL MEDICINE
Payer: COMMERCIAL

## 2021-12-29 ENCOUNTER — TELEPHONE (OUTPATIENT)
Dept: FAMILY MEDICINE | Facility: CLINIC | Age: 51
End: 2021-12-29

## 2021-12-29 VITALS
HEART RATE: 80 BPM | RESPIRATION RATE: 16 BRPM | SYSTOLIC BLOOD PRESSURE: 127 MMHG | DIASTOLIC BLOOD PRESSURE: 81 MMHG | HEIGHT: 75 IN | WEIGHT: 260 LBS | OXYGEN SATURATION: 96 % | TEMPERATURE: 97.6 F | BODY MASS INDEX: 32.33 KG/M2

## 2021-12-29 DIAGNOSIS — E78.5 HYPERLIPIDEMIA LDL GOAL <160: ICD-10-CM

## 2021-12-29 DIAGNOSIS — R06.02 SOB (SHORTNESS OF BREATH): ICD-10-CM

## 2021-12-29 DIAGNOSIS — J45.30 MILD PERSISTENT ASTHMA WITHOUT COMPLICATION: ICD-10-CM

## 2021-12-29 DIAGNOSIS — J45.30 MILD PERSISTENT ASTHMA WITHOUT COMPLICATION: Primary | ICD-10-CM

## 2021-12-29 DIAGNOSIS — R06.02 SOB (SHORTNESS OF BREATH): Primary | ICD-10-CM

## 2021-12-29 DIAGNOSIS — R20.2 PARESTHESIAS: ICD-10-CM

## 2021-12-29 LAB
ALBUMIN SERPL-MCNC: 4.1 G/DL (ref 3.4–5)
ALP SERPL-CCNC: 86 U/L (ref 40–150)
ALT SERPL W P-5'-P-CCNC: 52 U/L (ref 0–70)
ANION GAP SERPL CALCULATED.3IONS-SCNC: 3 MMOL/L (ref 3–14)
AST SERPL W P-5'-P-CCNC: 31 U/L (ref 0–45)
BASOPHILS # BLD AUTO: 0 10E3/UL (ref 0–0.2)
BASOPHILS NFR BLD AUTO: 0 %
BILIRUB SERPL-MCNC: 0.6 MG/DL (ref 0.2–1.3)
BUN SERPL-MCNC: 10 MG/DL (ref 7–30)
CALCIUM SERPL-MCNC: 9 MG/DL (ref 8.5–10.1)
CHLORIDE BLD-SCNC: 106 MMOL/L (ref 94–109)
CHOLEST SERPL-MCNC: 277 MG/DL
CO2 SERPL-SCNC: 29 MMOL/L (ref 20–32)
CREAT SERPL-MCNC: 0.98 MG/DL (ref 0.66–1.25)
CRP SERPL-MCNC: 3.9 MG/L (ref 0–8)
EOSINOPHIL # BLD AUTO: 0.1 10E3/UL (ref 0–0.7)
EOSINOPHIL NFR BLD AUTO: 2 %
ERYTHROCYTE [DISTWIDTH] IN BLOOD BY AUTOMATED COUNT: 13.5 % (ref 10–15)
ERYTHROCYTE [SEDIMENTATION RATE] IN BLOOD BY WESTERGREN METHOD: 4 MM/HR (ref 0–20)
FASTING STATUS PATIENT QL REPORTED: YES
GFR SERPL CREATININE-BSD FRML MDRD: >90 ML/MIN/1.73M2
GLUCOSE BLD-MCNC: 123 MG/DL (ref 70–99)
HCT VFR BLD AUTO: 45.7 % (ref 40–53)
HDLC SERPL-MCNC: 66 MG/DL
HGB BLD-MCNC: 16.1 G/DL (ref 13.3–17.7)
LDLC SERPL CALC-MCNC: 175 MG/DL
LYMPHOCYTES # BLD AUTO: 0.8 10E3/UL (ref 0.8–5.3)
LYMPHOCYTES NFR BLD AUTO: 14 %
MCH RBC QN AUTO: 30.6 PG (ref 26.5–33)
MCHC RBC AUTO-ENTMCNC: 35.2 G/DL (ref 31.5–36.5)
MCV RBC AUTO: 87 FL (ref 78–100)
MONOCYTES # BLD AUTO: 0.5 10E3/UL (ref 0–1.3)
MONOCYTES NFR BLD AUTO: 10 %
NEUTROPHILS # BLD AUTO: 3.8 10E3/UL (ref 1.6–8.3)
NEUTROPHILS NFR BLD AUTO: 73 %
NONHDLC SERPL-MCNC: 211 MG/DL
PLATELET # BLD AUTO: 126 10E3/UL (ref 150–450)
POTASSIUM BLD-SCNC: 4.6 MMOL/L (ref 3.4–5.3)
PROT SERPL-MCNC: 7.3 G/DL (ref 6.8–8.8)
RBC # BLD AUTO: 5.27 10E6/UL (ref 4.4–5.9)
SODIUM SERPL-SCNC: 138 MMOL/L (ref 133–144)
T4 FREE SERPL-MCNC: 0.79 NG/DL (ref 0.76–1.46)
TRIGL SERPL-MCNC: 178 MG/DL
TSH SERPL DL<=0.005 MIU/L-ACNC: 4.01 MU/L (ref 0.4–4)
WBC # BLD AUTO: 5.2 10E3/UL (ref 4–11)

## 2021-12-29 PROCEDURE — 80050 GENERAL HEALTH PANEL: CPT | Performed by: INTERNAL MEDICINE

## 2021-12-29 PROCEDURE — 36415 COLL VENOUS BLD VENIPUNCTURE: CPT | Performed by: INTERNAL MEDICINE

## 2021-12-29 PROCEDURE — 84439 ASSAY OF FREE THYROXINE: CPT | Performed by: INTERNAL MEDICINE

## 2021-12-29 PROCEDURE — 80061 LIPID PANEL: CPT | Performed by: INTERNAL MEDICINE

## 2021-12-29 PROCEDURE — 86140 C-REACTIVE PROTEIN: CPT | Performed by: INTERNAL MEDICINE

## 2021-12-29 PROCEDURE — 85652 RBC SED RATE AUTOMATED: CPT | Performed by: INTERNAL MEDICINE

## 2021-12-29 PROCEDURE — 71046 X-RAY EXAM CHEST 2 VIEWS: CPT | Performed by: RADIOLOGY

## 2021-12-29 PROCEDURE — 93000 ELECTROCARDIOGRAM COMPLETE: CPT | Performed by: INTERNAL MEDICINE

## 2021-12-29 PROCEDURE — 99214 OFFICE O/P EST MOD 30 MIN: CPT | Performed by: INTERNAL MEDICINE

## 2021-12-29 RX ORDER — PREDNISONE 20 MG/1
TABLET ORAL
Qty: 20 TABLET | Refills: 0 | Status: SHIPPED | OUTPATIENT
Start: 2021-12-29 | End: 2022-01-25

## 2021-12-29 RX ORDER — MONTELUKAST SODIUM 10 MG/1
10 TABLET ORAL AT BEDTIME
Qty: 30 TABLET | Refills: 1 | Status: SHIPPED | OUTPATIENT
Start: 2021-12-29 | End: 2022-01-25

## 2021-12-29 ASSESSMENT — MIFFLIN-ST. JEOR: SCORE: 2119.98

## 2021-12-29 NOTE — PROGRESS NOTES
Assessment & Plan     SOB (shortness of breath)  Mild persistent asthma without complication  1 year since COVID diagnosis, progressively worsening.   EKG completed and reassuring (CT calcium score reviewed from 2018 and also reassuring).  Will check labs and chest xray today.   Discussed if nonrevealing, likely aspect of COVID long haulers and/or worsening baseline asthma.   Discussed option to add daily oral Singulair to excellent current inhaler regimen. He declines and will plan to follow-up with pulm for optimization. Referral provided.   If nonrevealing xray, he would like short course oral steroid rx as his symptoms feel much better with steroids based on previous experiences. We did discuss risks of recurrent steroid use and he is aware, will plan to schedule with pulm for next steps.  - XR Chest 2 Views  - CBC with Platelets & Differential  - Comprehensive metabolic panel  - Erythrocyte sedimentation rate auto  - CRP inflammation  - Adult Pulmonary Medicine Referral    Hyperlipidemia LDL goal <160  He is fasting and requets lipid panel added to labs, he plans to discuss results with his PCP as reports borderline in the past.  - Lipid panel reflex to direct LDL Fasting    Paresthesias  Unclear etiology. Reports improves with alb inhaler use, possibly related to oxygenation? Check relevant labs:  - CBC with Platelets & Differential  - Comprehensive metabolic panel  - Erythrocyte sedimentation rate auto  - CRP inflammation  - TSH with free T4 reflex    Return in about 4 weeks (around 1/26/2022) for Follow up, with PCP, sooner if symptoms worsen or do not improve.    Susanna Hernández DO  [facility] DOMENIC Guardado is a 51 year old who presents for the following health issues     HPI       PCP: Candice Guardado presents for SOB concerns x 1 year since COVID diagnosis, progressively worsening.   All his life asthma  Lifelong  Inhalers-compliant  Since COVID one year ago, using albuterol  "rescue a bit more frequently, worse this fall  Using more often.  Chest pressure, dull ache and burning in lungs for a few months  Tingling/numbness in both toes, better with inhaler, \"feels like a lung thing\"  Has to sit up to get more oxygen at night  Winded when walking up stairs  Treadmill is fine when walking flat, 2.5 miles can walk without issue  But if pushing it,  gets sore to chest next day.   No f/c   Dry cough  Negative pcr COVID test a week ago  Prednisone helps, \"feel like I can run a [employer]\"; prescribed this for frequent sinus infections in the past and notices improvement in breathing.     Review of Systems   Constitutional, HEENT, cardiovascular, pulmonary, gi and gu systems are negative, except as otherwise noted.      Objective    /81 (BP Location: Left arm, Patient Position: Sitting, Cuff Size: Adult Large)   Pulse 80   Temp 97.6  F (36.4  C) (Temporal)   Resp 16   Ht 1.905 m (6' 3\")   Wt 117.9 kg (260 lb)   SpO2 96%   BMI 32.50 kg/m    Body mass index is 32.5 kg/m .  Physical Exam     GENERAL APPEARANCE: AAOx3, no distress. Well developed.    RESP: Lungs CTA bilaterally except mild end exp wheezes, scattered; no distress; no rhonchi    CV: RRR, S1/S2 present. No m/r/c.     PSYCH: appropriate mood and affect.               "

## 2021-12-29 NOTE — TELEPHONE ENCOUNTER
Reason for Call:  Medication or medication refill:    Do you use a [facility]?  Name of the pharmacy and phone number for the current request:  [facility] in [place] on [address]    Name of the medication requested: Singular    Other request: Pt would like this rx that you offered him this morning.     Can we leave a detailed message on this number? YES    Phone number patient can be reached at: Home number on file [phone] (home)    Best Time: anytime    Call taken on 12/29/2021 at 12:37 PM by Marina Zuñiga

## 2021-12-30 ENCOUNTER — LAB (OUTPATIENT)
Dept: URGENT CARE | Facility: URGENT CARE | Age: 51
End: 2021-12-30
Payer: COMMERCIAL

## 2021-12-30 DIAGNOSIS — Z11.59 ENCOUNTER FOR SCREENING FOR OTHER VIRAL DISEASES: ICD-10-CM

## 2021-12-30 PROCEDURE — U0005 INFEC AGEN DETEC AMPLI PROBE: HCPCS

## 2021-12-30 PROCEDURE — U0003 INFECTIOUS AGENT DETECTION BY NUCLEIC ACID (DNA OR RNA); SEVERE ACUTE RESPIRATORY SYNDROME CORONAVIRUS 2 (SARS-COV-2) (CORONAVIRUS DISEASE [COVID-19]), AMPLIFIED PROBE TECHNIQUE, MAKING USE OF HIGH THROUGHPUT TECHNOLOGIES AS DESCRIBED BY CMS-2020-01-R: HCPCS

## 2021-12-31 ENCOUNTER — TELEPHONE (OUTPATIENT)
Dept: URGENT CARE | Facility: URGENT CARE | Age: 51
End: 2021-12-31
Payer: COMMERCIAL

## 2021-12-31 LAB — SARS-COV-2 RNA RESP QL NAA+PROBE: POSITIVE

## 2021-12-31 NOTE — TELEPHONE ENCOUNTER
"-Coronavirus (COVID-19) Notification    Caller Name (Patient, parent, daughter/son, grandparent, etc)  Patient    Reason for call  Notify of Positive Coronavirus (COVID-19) lab results, assess symptoms,  review  [employer] recommendations    Lab Result    Lab test:  2019-nCoV rRt-PCR or SARS-CoV-2 PCR    Oropharyngeal AND/OR nasopharyngeal swabs is POSITIVE for 2019-nCoV RNA/SARS-COV-2 PCR (COVID-19 virus)    RN Recommendations/Instructions per [facility] Coronavirus COVID-19 recommendations    Brief introduction script  Introduce self then review script:  \"I am calling on behalf of [employer].  We were notified that your Coronavirus test (COVID-19) for was POSITIVE for the virus.  I have some information to relay to you but first I wanted to mention that the [place] Dept of Health will be contacting you shortly [it's possible MD already called Patient] to talk to you more about how you are feeling and other people you have had contact with who might now also have the virus.  Also,  [employer] [place] is Partnering with the [facility] for Covid-19 research, you may be contacted directly by research staff.\"    Assessment (Inquire about Patient's current symptoms)   Assessment   Current Symptoms at time of phone call: (if no symptoms, document No symptoms] No symptoms   Symptoms onset (if applicable) N/A     If at time of call, Patients symptoms hare worsened, the Patient should contact 911 or have someone drive them to Emergency Dept promptly:      If Patient calling 911, inform 911 personal that you have tested positive for the Coronavirus (COVID-19).  Place mask on and await 911 to arrive.    If Emergency Dept, If possible, please have another adult drive you to the Emergency Dept but you need to wear mask when in contact with other people.      Monoclonal Antibody Administration    You may be eligible to receive a new treatment with a monoclonal antibody for preventing hospitalization in " "patients at high risk for complications from COVID-19.   This medication is still experimental and available on a limited basis; it is given through an IV and must be given at an infusion center. Please note that not all people who are eligible will receive the medication since it is in limited supply.     Are you interested in being considered for this medication?  No.   Does the patient fit the criteria: No     If patient qualifies based on above criteria:  \"You will be contacted if you are selected to receive this treatment in the next 1-2 business days.   This is time sensitive and if you are not selected in the next 1-2 business days, you will not receive the medication.  If you do not receive a call to schedule, you have not been selected.\"      Review information with Patient    Your result was positive. This means you have COVID-19 (coronavirus).  We have sent you a letter that reviews the information that I'll be reviewing with you now.    How can I protect others?    If you have symptoms: stay home and away from others (self-isolate) until:    You've had no fever--and no medicine that reduces fever--for 1 full day (24 hours). And       Your other symptoms have gotten better. For example, your cough or breathing has improved. And     At least 10 days have passed since your symptoms started. (If you've been told by a doctor that you have a weak immune system, wait 20 days.)     If you don't have symptoms: Stay home and away from others (self-isolate) until at least 10 days have passed since your first positive COVID-19 test. (Date test collected)    During this time:    Stay in your own room, including for meals. Use your own bathroom if you can.    Stay away from others in your home. No hugging, kissing or shaking hands. No visitors.     Don't go to work, school or anywhere else.     Clean  high touch  surfaces often (doorknobs, counters, handles, etc.). Use a household cleaning spray or wipes. You'll find " a full list on the EPA website at www.epa.gov/pesticide-registration/list-n-disinfectants-use-against-sars-cov-2.     Cover your mouth and nose with a mask, tissue or other face covering to avoid spreading germs.    Wash your hands and face often with soap and water.    Make a list of people you have been in close contact with recently, even if either of you wore a face covering.   ; Start your list from 2 days before you became ill or had a positive test.  ; Include anyone that was within 6 feet of you for a cumulative total of 15 minutes or more in 24 hours. (Example: if you sat next to Ten for 5 minutes in the morning and 10 minutes in the afternoon, then you were in close contact for 15 minutes total that day. Ten would be added to your list.)    A public health worker will call or text you. It is important that you answer. They will ask you questions about possible exposures to COVID-19, such as people you have been in direct contact with and places you have visited.    Tell the people on your list that you have COVID-19; they should stay away from others for 14 days starting from the last time they were in contact with you (unless you are told something different from a public health worker).     Caregivers in these groups are at risk for severe illness due to COVID-19:  o People 65 years and older  o People who live in a nursing home or long-term care facility  o People with chronic disease (lung, heart, cancer, diabetes, kidney, liver, immunologic)  o People who have a weakened immune system, including those who:  - Are in cancer treatment  - Take medicine that weakens the immune system, such as corticosteroids  - Had a bone marrow or organ transplant  - Have an immune deficiency  - Have poorly controlled HIV or AIDS  - Are obese (body mass index of 40 or higher)  - Smoke regularly    Caregivers should wear gloves while washing dishes, handling laundry and cleaning bedrooms and bathrooms.    Wash and dry  laundry with special caution. Don't shake dirty laundry, and use the warmest water setting you can.    If you have a weakened immune system, ask your doctor about other actions you should take.    For more tips, go to www.cdc.gov/coronavirus/2019-ncov/downloads/10Things.pdf.    You should not go back to work until you meet the guidelines above for ending your home isolation. You don't need to be retested for COVID-19 before going back to work--studies show that you won't spread the virus if it's been at least 10 days since your symptoms started (or 20 days, if you have a weak immune system).    Employers: This document serves as formal notice of your employee's medical guidelines for going back to work. They must meet the above guidelines before going back to work in person.    How can I take care of myself?    1. Get lots of rest. Drink extra fluids (unless a doctor has told you not to).    2. Take Tylenol (acetaminophen) for fever or pain. If you have liver or kidney problems, ask your family doctor if it's okay to take Tylenol.     Take either:     650 mg (two 325 mg pills) every 4 to 6 hours, or     1,000 mg (two 500 mg pills) every 8 hours as needed.     Note: Don't take more than 3,000 mg in one day. Acetaminophen is found in many medicines (both prescribed and over-the-counter medicines). Read all labels to be sure you don't take too much.    For children, check the Tylenol bottle for the right dose (based on their age or weight).    3. If you have other health problems (like cancer, heart failure, an organ transplant or severe kidney disease): Call your specialty clinic if you don't feel better in the next 2 days.    4. Know when to call 911: Emergency warning signs include:    Trouble breathing or shortness of breath    Pain or pressure in the chest that doesn't go away    Feeling confused like you haven't felt before, or not being able to wake up    Bluish-colored lips or face    5. Sign up for [facility]  [employer]eng. We know it's scary to hear that you have COVID-19. We want to track your symptoms to make sure you're okay over the next 2 weeks. Please look for an email from Lindsay Ellsworth--this is a free, online program that we'll use to keep in touch. To sign up, follow the link in the email. Learn more at [email or web address].    Where can I get more information?    [facility]: [email or web address]    Coronavirus Basics: www.health.[facility].[place]./diseases/coronavirus/basics.html    What to Do If You're Sick: www.cdc.gov/coronavirus/2019-ncov/about/steps-when-sick.html    Ending Home Isolation: www.cdc.gov/coronavirus/2019-ncov/hcp/disposition-in-home-patients.html     Caring for Someone with COVID-19: www.cdc.gov/coronavirus/2019-ncov/if-you-are-sick/care-for-someone.html     [facility] clinical trials (COVID-19 research studies): clinicalaffairs.[facility]-clinical-trials     A Positive COVID-19 letter will be sent via [employer] or the mail. (Exception, no letters sent to Presurgerical/Preprocedure Patients)    Patient declines quarantine instructions.  Patient has no further questions.    Silvina Najera LPN

## 2022-01-03 DIAGNOSIS — J45.30 MILD PERSISTENT ASTHMA WITHOUT COMPLICATION: Primary | ICD-10-CM

## 2022-01-25 ENCOUNTER — OFFICE VISIT (OUTPATIENT)
Dept: FAMILY MEDICINE | Facility: CLINIC | Age: 52
End: 2022-01-25
Payer: COMMERCIAL

## 2022-01-25 VITALS
WEIGHT: 258 LBS | SYSTOLIC BLOOD PRESSURE: 142 MMHG | DIASTOLIC BLOOD PRESSURE: 96 MMHG | TEMPERATURE: 96.9 F | OXYGEN SATURATION: 98 % | BODY MASS INDEX: 32.08 KG/M2 | HEART RATE: 88 BPM | HEIGHT: 75 IN | RESPIRATION RATE: 20 BRPM

## 2022-01-25 DIAGNOSIS — R79.89 ELEVATED TSH: ICD-10-CM

## 2022-01-25 DIAGNOSIS — J45.30 MILD PERSISTENT ASTHMA WITHOUT COMPLICATION: Primary | ICD-10-CM

## 2022-01-25 DIAGNOSIS — D69.6 THROMBOCYTOPENIA (H): ICD-10-CM

## 2022-01-25 PROCEDURE — 99214 OFFICE O/P EST MOD 30 MIN: CPT | Performed by: PHYSICIAN ASSISTANT

## 2022-01-25 RX ORDER — MONTELUKAST SODIUM 10 MG/1
10 TABLET ORAL AT BEDTIME
Qty: 90 TABLET | Refills: 1 | Status: SHIPPED | OUTPATIENT
Start: 2022-01-25 | End: 2022-08-10

## 2022-01-25 ASSESSMENT — PAIN SCALES - GENERAL: PAINLEVEL: NO PAIN (0)

## 2022-01-25 ASSESSMENT — MIFFLIN-ST. JEOR: SCORE: 2110.91

## 2022-01-25 ASSESSMENT — ASTHMA QUESTIONNAIRES: ACT_TOTALSCORE: 18

## 2022-01-25 NOTE — PROGRESS NOTES
HPI: Geo is a 52 yo male here for f/u asthma  His baseline asthma has been worse since he had covid last year  Found he was using albuterol tid for about a month  He tested positive for covid on 12/30/21.  He also had covid last year.  He was vaccinated for covid on 4/10/21 (Directly) and boostered on 12/16/21)  Started singulair but taking about half of the time since hard to remember PM dosing    Pt was seen by Dr. Hernández on 12/29/21 for sob; note reviewed in epic  The prednisone did help him  Working out he notes some chest pressure/heartburn  That has improved with improved eating  Reviewed coronary calcium scan that was done in 2018  CXR on 12/29/21 normal  Glucose was 123 on 12/29/21    He has PFTs scheduled for 2/4 but will need to reschedule due to work    Hyperlipidemia, he'd like to recheck that as he is trying to lose weight.    Recent Labs   Lab Test 12/29/21  0813 03/06/20  0755 02/02/17  0813 09/29/14  0841   CHOL 277* 187   < > 220*   HDL 66 37*   < > 55   * 123*   < > 116   TRIG 178* 135   < > 247*   CHOLHDLRATIO  --   --   --  4.0    < > = values in this interval not displayed.             Past Medical History:   Diagnosis Date     Basal cell carcinoma, scalp/neck 01/01/2015    s/p mohs     Clinical diagnosis of COVID-19 12/2020     Mild persistent asthma      Past Surgical History:   Procedure Laterality Date     TONSILLECTOMY       Social History     Tobacco Use     Smoking status: Never Smoker     Smokeless tobacco: Never Used   Substance Use Topics     Alcohol use: Yes     Alcohol/week: 0.0 standard drinks     Comment: 4-5 drinks per week     Current Outpatient Medications   Medication Sig Dispense Refill     albuterol (PROAIR HFA/PROVENTIL HFA/VENTOLIN HFA) 108 (90 Base) MCG/ACT inhaler Inhale 2 puffs into the lungs every 6 hours as needed for shortness of breath / dyspnea 3 Inhaler 3     budesonide (PULMICORT FLEXHALER) 180 MCG/ACT inhaler Inhale 1 puff into the lungs 2 times daily 3  "each 3     cetirizine (ZYRTEC) 10 MG tablet Take 1 tablet (10 mg) by mouth daily       Cholecalciferol (VITAMIN D) 2000 UNITS tablet Take 1 tablet by mouth daily       montelukast (SINGULAIR) 10 MG tablet Take 1 tablet (10 mg) by mouth At Bedtime 30 tablet 1     Multiple Vitamins-Minerals (AIRBORNE GUMMIES PO) Take by mouth daily       salmeterol (SEREVENT DISKUS) 50 MCG/DOSE inhaler INHALE 1 PUFF INTO THE LUNGS TWICE DAILY 180 each 3     triamcinolone (KENALOG) 0.1 % external cream Apply sparingly to affected area three times daily for 14 days. 30 g 1     Allergies   Allergen Reactions     No Known Allergies          PHYSICAL EXAM:    BP (!) 136/93 (BP Location: Right arm, Patient Position: Sitting, Cuff Size: Adult Large)   Pulse 88   Temp 96.9  F (36.1  C) (Temporal)   Resp 20   Ht 1.905 m (6' 3\")   Wt 117 kg (258 lb)   SpO2 98%   BMI 32.25 kg/m      Patient appears non toxic  Lungs: Faint expiratory wheeze  Heart: RRR  Psych: approp affect and mood    Assessment and Plan:     (J45.30) Mild persistent asthma without complication  (primary encounter diagnosis)  Comment: suspect worse as did test positive for covid in 12/20/21 so hopefully will continue to improve.  He is compliant with his inhalers.  Plan: added montelukast (SINGULAIR) 10 MG tablet        He was not using this regularly since hard for him to remember to take this at bedtime. Recd he swtich to AM dosing as that is better than forgetting.   PFTs scheduled.    (R79.89) Elevated TSH  Comment:   Plan: TSH with free T4 reflex due in Mid Feb            (D69.6) Thrombocytopenia (H)  Comment:   Plan: CBC with platelets repeat mid feb          *BP elevated today.    Patient Instructions   Check your blood pressure at home. Goal is <140/90 but optimal is 120/80.    Be more compliant with singulair and switch to AM dosing since PM dosing has been to hard to remember.    We can talk again after PFTs        Silvina Harvey PA-C        "

## 2022-01-25 NOTE — PATIENT INSTRUCTIONS
Check your blood pressure at home. Goal is <140/90 but optimal is 120/80.    Be more compliant with singulair and switch to AM dosing since PM dosing has been to hard to remember.    We can talk again after PFTs

## 2022-01-26 ENCOUNTER — TELEPHONE (OUTPATIENT)
Dept: PULMONOLOGY | Facility: CLINIC | Age: 52
End: 2022-01-26
Payer: COMMERCIAL

## 2022-01-26 NOTE — TELEPHONE ENCOUNTER
Called patient and left a voicemail. Wanted to see if patient was interested in seeing Dr. Leary sooner than 3/18 appointment.     Joan Berg MA

## 2022-01-27 ENCOUNTER — HOSPITAL ENCOUNTER (OUTPATIENT)
Facility: CLINIC | Age: 52
Discharge: HOME | End: 2022-01-27
Attending: INTERNAL MEDICINE | Admitting: INTERNAL MEDICINE
Payer: COMMERCIAL

## 2022-01-27 VITALS
TEMPERATURE: 97.3 F | RESPIRATION RATE: 12 BRPM | DIASTOLIC BLOOD PRESSURE: 81 MMHG | OXYGEN SATURATION: 96 % | SYSTOLIC BLOOD PRESSURE: 100 MMHG | BODY MASS INDEX: 30.5 KG/M2 | HEART RATE: 88 BPM | WEIGHT: 244 LBS

## 2022-01-27 LAB — COLONOSCOPY: NORMAL

## 2022-01-27 PROCEDURE — 45378 DIAGNOSTIC COLONOSCOPY: CPT | Performed by: INTERNAL MEDICINE

## 2022-01-27 PROCEDURE — 999N000099 HC STATISTIC MODERATE SEDATION < 10 MIN: Performed by: INTERNAL MEDICINE

## 2022-01-27 PROCEDURE — G0121 COLON CA SCRN NOT HI RSK IND: HCPCS | Performed by: INTERNAL MEDICINE

## 2022-01-27 PROCEDURE — G0500 MOD SEDAT ENDO SERVICE >5YRS: HCPCS | Performed by: INTERNAL MEDICINE

## 2022-01-27 PROCEDURE — 250N000011 HC RX IP 250 OP 636: Performed by: INTERNAL MEDICINE

## 2022-01-27 RX ORDER — FENTANYL CITRATE 50 UG/ML
INJECTION, SOLUTION INTRAMUSCULAR; INTRAVENOUS PRN
Status: COMPLETED | OUTPATIENT
Start: 2022-01-27 | End: 2022-01-27

## 2022-01-27 RX ADMIN — MIDAZOLAM 2 MG: 1 INJECTION INTRAMUSCULAR; INTRAVENOUS at 11:17

## 2022-01-27 RX ADMIN — MIDAZOLAM 2 MG: 1 INJECTION INTRAMUSCULAR; INTRAVENOUS at 11:18

## 2022-01-27 RX ADMIN — FENTANYL CITRATE 100 MCG: 50 INJECTION, SOLUTION INTRAMUSCULAR; INTRAVENOUS at 11:15

## 2022-01-27 NOTE — H&P
[facility]  Pre-Endoscopy History and Physical     Bobby Alexandre MRN# [id number]   YOB: 1970 Age: 51 year old     Date of Procedure: (Not on file)  Primary care provider: Silvina Harvey  Type of Endoscopy: colonoscopy  Reason for Procedure: Screening  Type of Anesthesia Anticipated: Moderate Sedation    HPI:    Bobby is a 51 year old male who will be undergoing the above procedure.      A history and physical has been performed. The patient's medications and allergies have been reviewed. The risks and benefits of the procedure and the sedation options and risks were discussed with the patient.  All questions were answered and informed consent was obtained.      He denies a personal or family history of anesthesia complications or bleeding disorders.     Allergies   Allergen Reactions     No Known Allergies         Cannot display prior to admission medications because the patient has not been admitted in this contact.       Patient Active Problem List   Diagnosis     Mild persistent asthma     Hyperlipidemia LDL goal <160     Basal cell carcinoma, scalp/neck     Restless legs syndrome (RLS)        Past Medical History:   Diagnosis Date     Basal cell carcinoma, scalp/neck 01/01/2015    s/p mohs     Clinical diagnosis of COVID-19 12/2020     Mild persistent asthma         Past Surgical History:   Procedure Laterality Date     TONSILLECTOMY         Social History     Tobacco Use     Smoking status: Never Smoker     Smokeless tobacco: Never Used   Substance Use Topics     Alcohol use: Yes     Alcohol/week: 0.0 standard drinks     Comment: 4-5 drinks per week       Family History   Problem Relation Age of Onset     Breast Cancer Mother      Diabetes Mother 65     Skin Cancer Mother      Lipids Father         alive age 74     Coronary Artery Disease Paternal Grandfather 52        AMI       REVIEW OF SYSTEMS:     5 point ROS negative except as noted above in HPI,  "including Gen., Resp., CV, GI &  system review.      PHYSICAL EXAM:   There were no vitals taken for this visit. Estimated body mass index is 32.25 kg/m  as calculated from the following:    Height as of 1/25/22: 1.905 m (6' 3\").    Weight as of 1/25/22: 117 kg (258 lb).   GENERAL APPEARANCE: healthy, alert and no distress  MENTAL STATUS: alert  AIRWAY EXAM: Mallampatti Class I (visualization of the soft palate, fauces, uvula, anterior and posterior pillars)  RESP: lungs clear to auscultation - no rales, rhonchi or wheezes  CV: normal S1 S2, no S3 or S4      DIAGNOSTICS:    Not indicated      IMPRESSION   ASA Class 2 - Mild systemic disease        PLAN:       Plan for colonoscopy. We discussed the risks, benefits and alternatives and the patient wished to proceed.    The above has been forwarded to the consulting provider.      Signed Electronically by: Shad Cage MD,MD  January 27, 2022  "

## 2022-02-23 ENCOUNTER — ALLIED HEALTH/NURSE VISIT (OUTPATIENT)
Dept: PULMONOLOGY | Facility: CLINIC | Age: 52
End: 2022-02-23
Payer: COMMERCIAL

## 2022-02-23 DIAGNOSIS — J45.30 MILD PERSISTENT ASTHMA WITHOUT COMPLICATION: ICD-10-CM

## 2022-02-23 PROCEDURE — 94726 PLETHYSMOGRAPHY LUNG VOLUMES: CPT | Performed by: INTERNAL MEDICINE

## 2022-02-23 PROCEDURE — 94729 DIFFUSING CAPACITY: CPT | Performed by: INTERNAL MEDICINE

## 2022-02-23 PROCEDURE — 94375 RESPIRATORY FLOW VOLUME LOOP: CPT | Performed by: INTERNAL MEDICINE

## 2022-02-24 LAB
DLCOUNC-%PRED-PRE: 114 %
DLCOUNC-PRE: 38.79 ML/MIN/MMHG
DLCOUNC-PRED: 33.87 ML/MIN/MMHG
ERV-%PRED-PRE: 68 %
ERV-PRE: 0.97 L
ERV-PRED: 1.42 L
EXPTIME-PRE: 7.03 SEC
FEF2575-%PRED-PRE: 76 %
FEF2575-PRE: 3.03 L/SEC
FEF2575-PRED: 3.96 L/SEC
FEFMAX-%PRED-PRE: 108 %
FEFMAX-PRE: 11.84 L/SEC
FEFMAX-PRED: 10.95 L/SEC
FEV1-%PRED-PRE: 82 %
FEV1-PRE: 3.76 L
FEV1FEV6-PRE: 77 %
FEV1FEV6-PRED: 80 %
FEV1FVC-PRE: 77 %
FEV1FVC-PRED: 78 %
FEV1SVC-PRE: 79 %
FEV1SVC-PRED: 75 %
FIFMAX-PRE: 9.05 L/SEC
FRCPLETH-%PRED-PRE: 92 %
FRCPLETH-PRE: 3.54 L
FRCPLETH-PRED: 3.83 L
FVC-%PRED-PRE: 83 %
FVC-PRE: 4.88 L
FVC-PRED: 5.86 L
IC-%PRED-PRE: 81 %
IC-PRE: 3.8 L
IC-PRED: 4.66 L
RVPLETH-%PRED-PRE: 107 %
RVPLETH-PRE: 2.57 L
RVPLETH-PRED: 2.39 L
TLCPLETH-%PRED-PRE: 90 %
TLCPLETH-PRE: 7.34 L
TLCPLETH-PRED: 8.14 L
VA-%PRED-PRE: 85 %
VA-PRE: 6.7 L
VC-%PRED-PRE: 78 %
VC-PRE: 4.77 L
VC-PRED: 6.07 L

## 2022-03-02 ENCOUNTER — LAB (OUTPATIENT)
Dept: LAB | Facility: CLINIC | Age: 52
End: 2022-03-02
Payer: COMMERCIAL

## 2022-03-02 DIAGNOSIS — R79.89 ELEVATED TSH: ICD-10-CM

## 2022-03-02 DIAGNOSIS — D69.6 THROMBOCYTOPENIA (H): ICD-10-CM

## 2022-03-02 LAB
ERYTHROCYTE [DISTWIDTH] IN BLOOD BY AUTOMATED COUNT: 13.3 % (ref 10–15)
HCT VFR BLD AUTO: 45.2 % (ref 40–53)
HGB BLD-MCNC: 15.8 G/DL (ref 13.3–17.7)
MCH RBC QN AUTO: 30.1 PG (ref 26.5–33)
MCHC RBC AUTO-ENTMCNC: 35 G/DL (ref 31.5–36.5)
MCV RBC AUTO: 86 FL (ref 78–100)
PLATELET # BLD AUTO: 143 10E3/UL (ref 150–450)
RBC # BLD AUTO: 5.25 10E6/UL (ref 4.4–5.9)
TSH SERPL DL<=0.005 MIU/L-ACNC: 3.16 MU/L (ref 0.4–4)
WBC # BLD AUTO: 6.2 10E3/UL (ref 4–11)

## 2022-03-02 PROCEDURE — 85027 COMPLETE CBC AUTOMATED: CPT

## 2022-03-02 PROCEDURE — 36415 COLL VENOUS BLD VENIPUNCTURE: CPT

## 2022-03-02 PROCEDURE — 84443 ASSAY THYROID STIM HORMONE: CPT

## 2022-03-03 NOTE — RESULT ENCOUNTER NOTE
Isabella Guardado,    Your thyroid test is back in normal range. We should check that every 6-12 months.  Your platelets are closer to normal now. We will also keep an eye on these.    Silvina Harvey PA-C

## 2022-03-17 NOTE — PROGRESS NOTES
"Pulmonary Clinic Note    Date of Service: 3/18/2022    Chief Complaint   Patient presents with     New Patient     Shortness of Breath and mild persistant asthma without complications       A/P:  51M being seen for asthma. Consistent with moderate persistent asthma. No e/o VCD. PFTs and CXR normal, no e/o other underlying lung disease. Will trial stepping up his therapy to Trelegy ([id number]) and continue prn albuterol. Continue montelukast. RTC 3mo.     History:  51M HLD, asthma being seen for dyspnea. Pt had COVID 12/2020 and has been worsening since then. He was seen by PCP 12/29/21 for worsening dyspnea. CXR clear. Given course of prednisone. Pt ultimately diagnosed w/ COVID again 12/30/21, not hospitalized. For asthma, he is prescribed salmeterol, budesonide, albuterol, and montelukast. Previously, never took albuterol. Now taking 2-3x/day, does help. Feels more chest tightness. More difficulty getting air out. Dyspnea not clearly brought on by activity, but states no SOB at rest. Does have MATAMOROS w/ stairs. Prednisone helps. Not brought on by odors or perfumes. No cough. No nocturnal cough or SOB. No orthopnea or PND. No LE edema. No F/C, recent illnesses, dysphagia, throat tightness, hoarseness, ab pain, N/V. Some GERD, meds help. Some seasonal allergies, cetirizine helps.    Smoking: never  Hot tub exposure: no       Recent travel: several trips in the last few months                   Hx of incarceration: no      Bird exposure: no             Animal exposure: 1 dog        Inhalation exposure: no      Works for [employer] in [place]. No FHx lung disease. No other personal hx of lung disease.                           10 point review of systems negative, aside from that mentioned in HPI.    /87 (BP Location: Left arm, Patient Position: Sitting, Cuff Size: Adult Large)   Pulse 83   Ht 1.905 m (6' 3\")   Wt 119.2 kg (262 lb 11.2 oz)   SpO2 99%   BMI 32.84 kg/m    Gen: well-appearing  HEENT: Mallampati " I  Card: RRR  Pulm: clear bilaterally   Abd: soft  MSK: no edema  Skin: no obvious rash  Psych: normal affect  Neuro: alert and oriented     Labs:  Personally reviewed  Abs eos (12/2021) - 100    Imaging/Studies: Personally reviewed  PFTs (2/2022) - normal pulmonary function  CXR (12/2021) - mild elevation R hemidiaphragm, no acute findings, clear lungs    Past Medical History:   Diagnosis Date     Basal cell carcinoma, scalp/neck 01/01/2015    s/p mohs     Clinical diagnosis of COVID-19 12/2020     Mild persistent asthma      Past Surgical History:   Procedure Laterality Date     COLONOSCOPY N/A 1/27/2022    Procedure: COLONOSCOPY;  Surgeon: Shad Cage MD;  Location:  GI     TONSILLECTOMY       Family History   Problem Relation Age of Onset     Breast Cancer Mother      Diabetes Mother 65     Skin Cancer Mother      Lipids Father         alive age 74     Coronary Artery Disease Paternal Grandfather 52        AMI     Social History     Socioeconomic History     Marital status:      Spouse name: Not on file     Number of children: Not on file     Years of education: Not on file     Highest education level: Not on file   Occupational History     Not on file   Tobacco Use     Smoking status: Never Smoker     Smokeless tobacco: Never Used   Substance and Sexual Activity     Alcohol use: Yes     Alcohol/week: 0.0 standard drinks     Comment: 4-5 drinks per week     Drug use: No     Sexual activity: Yes     Partners: Female   Other Topics Concern     Parent/sibling w/ CABG, MI or angioplasty before 65F 55M? No   Social History Narrative    , 18 and 20    Wife is a teacher     Social Determinants of Health     Financial Resource Strain: Not on file   Food Insecurity: Not on file   Transportation Needs: Not on file   Physical Activity: Not on file   Stress: Not on file   Social Connections: Not on file   Intimate Partner Violence: Not on file   Housing Stability: Not on file       50 minutes  spent reviewing chart, reviewing test results, talking with and examining patient, formulating plan, and documentation on the day of the encounter.    Demarcus Leary MD  Pulmonary and Critical Care Medicine  [facility]

## 2022-03-18 ENCOUNTER — OFFICE VISIT (OUTPATIENT)
Dept: PULMONOLOGY | Facility: CLINIC | Age: 52
End: 2022-03-18
Attending: INTERNAL MEDICINE
Payer: COMMERCIAL

## 2022-03-18 VITALS
SYSTOLIC BLOOD PRESSURE: 121 MMHG | DIASTOLIC BLOOD PRESSURE: 87 MMHG | HEIGHT: 75 IN | HEART RATE: 83 BPM | BODY MASS INDEX: 32.66 KG/M2 | WEIGHT: 262.7 LBS | OXYGEN SATURATION: 99 %

## 2022-03-18 DIAGNOSIS — R06.02 SOB (SHORTNESS OF BREATH): ICD-10-CM

## 2022-03-18 DIAGNOSIS — J45.30 MILD PERSISTENT ASTHMA WITHOUT COMPLICATION: Primary | ICD-10-CM

## 2022-03-18 PROCEDURE — 99204 OFFICE O/P NEW MOD 45 MIN: CPT | Performed by: STUDENT IN AN ORGANIZED HEALTH CARE EDUCATION/TRAINING PROGRAM

## 2022-03-18 RX ORDER — ALBUTEROL SULFATE 90 UG/1
2 AEROSOL, METERED RESPIRATORY (INHALATION) EVERY 6 HOURS PRN
Qty: 18 G | Refills: 4 | Status: SHIPPED | OUTPATIENT
Start: 2022-03-18

## 2022-03-18 ASSESSMENT — PAIN SCALES - GENERAL: PAINLEVEL: NO PAIN (0)

## 2022-03-18 NOTE — PATIENT INSTRUCTIONS
Thank you for coming to pulmonary clinic. Your pulmonary function tests are normal. Your chest imaging is normal. I would like you to step-up your asthma therapy to Trelegy once daily. Stop serevent and pulmicort. Continue albuterol as needed. I will see you back in 3 months.

## 2022-03-18 NOTE — NURSING NOTE
"Chief Complaint   Patient presents with     New Patient     Shortness of Breath and mild persistant asthma without complications       Vitals:    03/18/22 0732   BP: 121/87   BP Location: Left arm   Patient Position: Sitting   Cuff Size: Adult Large   Pulse: 83   SpO2: 99%   Weight: 119.2 kg (262 lb 11.2 oz)   Height: 1.905 m (6' 3\")       Body mass index is 32.84 kg/m .    Daphne Yan MA    "

## 2022-03-18 NOTE — LETTER
3/18/2022         RE: Bobby Alexandre  [address]  [facility] [address]        Dear Colleague,    Thank you for referring your patient, Bobby Alexandre, to the [facility] [place]. Please see a copy of my visit note below.    Pulmonary Clinic Note    Date of Service: 3/18/2022    Chief Complaint   Patient presents with     New Patient     Shortness of Breath and mild persistant asthma without complications     A/P:  51M being seen for asthma. Consistent with moderate persistent asthma. No e/o VCD. PFTs and CXR normal, no e/o other underlying lung disease. Will trial stepping up his therapy to Trelegy ([id number]) and continue prn albuterol. Continue montelukast. RTC 3mo.     History:  51M HLD, asthma being seen for dyspnea. Pt had COVID 12/2020 and has been worsening since then. He was seen by PCP 12/29/21 for worsening dyspnea. CXR clear. Given course of prednisone. Pt ultimately diagnosed w/ COVID again 12/30/21, not hospitalized. For asthma, he is prescribed salmeterol, budesonide, albuterol, and montelukast. Previously, never took albuterol. Now taking 2-3x/day, does help. Feels more chest tightness. More difficulty getting air out. Dyspnea not clearly brought on by activity, but states no SOB at rest. Does have MATAMOROS w/ stairs. Prednisone helps. Not brought on by odors or perfumes. No cough. No nocturnal cough or SOB. No orthopnea or PND. No LE edema. No F/C, recent illnesses, dysphagia, throat tightness, hoarseness, ab pain, N/V. Some GERD, meds help. Some seasonal allergies, cetirizine helps.    Smoking: never  Hot tub exposure: no       Recent travel: several trips in the last few months                   Hx of incarceration: no      Bird exposure: no             Animal exposure: 1 dog        Inhalation exposure: no      Works for [employer] in [employer]. No FHx lung disease. No other personal hx of lung disease.                      10 point review of systems negative,  "aside from that mentioned in HPI.    /87 (BP Location: Left arm, Patient Position: Sitting, Cuff Size: Adult Large)   Pulse 83   Ht 1.905 m (6' 3\")   Wt 119.2 kg (262 lb 11.2 oz)   SpO2 99%   BMI 32.84 kg/m    Gen: well-appearing  HEENT: Mallampati I  Card: RRR  Pulm: clear bilaterally   Abd: soft  MSK: no edema  Skin: no obvious rash  Psych: normal affect  Neuro: alert and oriented     Labs:  Personally reviewed  Abs eos (12/2021) - 100    Imaging/Studies: Personally reviewed  PFTs (2/2022) - normal pulmonary function  CXR (12/2021) - mild elevation R hemidiaphragm, no acute findings, clear lungs    Past Medical History:   Diagnosis Date     Basal cell carcinoma, scalp/neck 01/01/2015    s/p mohs     Clinical diagnosis of COVID-19 12/2020     Mild persistent asthma      Past Surgical History:   Procedure Laterality Date     COLONOSCOPY N/A 1/27/2022    Procedure: COLONOSCOPY;  Surgeon: Shad Cage MD;  Location:  GI     TONSILLECTOMY       Family History   Problem Relation Age of Onset     Breast Cancer Mother      Diabetes Mother 65     Skin Cancer Mother      Lipids Father         alive age 74     Coronary Artery Disease Paternal Grandfather 52        AMI     Social History     Socioeconomic History     Marital status:      Spouse name: Not on file     Number of children: Not on file     Years of education: Not on file     Highest education level: Not on file   Occupational History     Not on file   Tobacco Use     Smoking status: Never Smoker     Smokeless tobacco: Never Used   Substance and Sexual Activity     Alcohol use: Yes     Alcohol/week: 0.0 standard drinks     Comment: 4-5 drinks per week     Drug use: No     Sexual activity: Yes     Partners: Female   Other Topics Concern     Parent/sibling w/ CABG, MI or angioplasty before 65F 55M? No   Social History Narrative    , 18 and 20    Wife is a teacher     Social Determinants of Health     Financial Resource Strain: Not " on file   Food Insecurity: Not on file   Transportation Needs: Not on file   Physical Activity: Not on file   Stress: Not on file   Social Connections: Not on file   Intimate Partner Violence: Not on file   Housing Stability: Not on file     50 minutes spent reviewing chart, reviewing test results, talking with and examining patient, formulating plan, and documentation on the day of the encounter.    Demarcus Leary MD  Pulmonary and Critical Care Medicine  [facility]

## 2022-04-07 ENCOUNTER — MYC MEDICAL ADVICE (OUTPATIENT)
Dept: FAMILY MEDICINE | Facility: CLINIC | Age: 52
End: 2022-04-07
Payer: COMMERCIAL

## 2022-04-07 DIAGNOSIS — J45.30 MILD PERSISTENT ASTHMA WITHOUT COMPLICATION: ICD-10-CM

## 2022-04-07 NOTE — TELEPHONE ENCOUNTER
Medication is being filled for 1 time refill only due to:  Patient needs to be seen because appointment this month. .   Zahida Bauer RN  -[facility]

## 2022-04-14 ENCOUNTER — VIRTUAL VISIT (OUTPATIENT)
Dept: FAMILY MEDICINE | Facility: CLINIC | Age: 52
End: 2022-04-14
Payer: COMMERCIAL

## 2022-04-14 DIAGNOSIS — E78.5 HYPERLIPIDEMIA LDL GOAL <160: ICD-10-CM

## 2022-04-14 DIAGNOSIS — J45.40 MODERATE PERSISTENT ASTHMA WITHOUT COMPLICATION: Primary | ICD-10-CM

## 2022-04-14 PROCEDURE — 99213 OFFICE O/P EST LOW 20 MIN: CPT | Mod: GT | Performed by: PHYSICIAN ASSISTANT

## 2022-04-14 NOTE — PROGRESS NOTES
Geo is a 51 year old who is being evaluated via a billable video visit.      How would you like to obtain your AVS? MyChart  If the video visit is dropped, the invitation should be resent by: MY CHART  Will anyone else be joining your video visit? No      Video Start Time: 5:04 PM        Subjective   Geo is a 51 year old who presents for the following health issues: asthma f/u    HPI       Pt did see pulm (Dr. Cuenca)  for worsening asthma following covid  He was switched from serevent/pulmicort to trelegy and notes sig improvement in his breathing.  He had normal CXR and PFTs  He has continued singulair as well  He rarely needs to use his albuterol inh    He has hx of optic migraines   Had a few last week  Describes this as a vision change followed by a HA  Not sure of triggers.  Doesn't need to take anything when this comes since resolves within about 10min.      Review of Systems         Objective           Vitals:  No vitals were obtained today due to virtual visit.    Physical Exam   GENERAL: Healthy, alert and no distress  EYES: Eyes grossly normal to inspection.  No discharge or erythema, or obvious scleral/conjunctival abnormalities.  RESP: No audible wheeze, cough, or visible cyanosis.  No visible retractions or increased work of breathing.    SKIN: Visible skin clear. No significant rash, abnormal pigmentation or lesions.  NEURO: Cranial nerves grossly intact.  Mentation and speech appropriate for age.  PSYCH: Mentation appears normal, affect normal/bright, judgement and insight intact, normal speech and appearance well-groomed.    Recent Labs   Lab Test 12/29/21  0813 03/06/20  0755 02/02/17  0813 09/29/14  0841   CHOL 277* 187   < > 220*   HDL 66 37*   < > 55   * 123*   < > 116   TRIG 178* 135   < > 247*   CHOLHDLRATIO  --   --   --  4.0    < > = values in this interval not displayed.     Assessment and Plan:     (J45.40) Moderate persistent asthma without complication  (primary encounter  diagnosis)  Comment: breathing markedly improved on trelegy which is refilled. He wonders if the singulair doing anything for him so may try going off which is fine.  Plan: Fluticasone-Umeclidin-Vilanterol (TRELEGY         ELLIPTA) 200-62.5-25 MCG/INH oral inhaler            (E78.5) Hyperlipidemia LDL goal <160  Comment:   Plan: recd one scoop of metamucil per day, high fiber/low chol diet. Repeat lipids in 6 months.      Silvina Harvey PA-C                Video-Visit Details    Type of service:  Video Visit    Video End Time:5:23 PM    Originating Location (pt. Location): Home    Distant Location (provider location):  [facility]     Platform used for Video Visit: Brittney

## 2022-05-12 ENCOUNTER — TELEPHONE (OUTPATIENT)
Dept: PULMONOLOGY | Facility: CLINIC | Age: 52
End: 2022-05-12

## 2022-06-11 ENCOUNTER — HEALTH MAINTENANCE LETTER (OUTPATIENT)
Age: 52
End: 2022-06-11

## 2022-06-28 NOTE — TELEPHONE ENCOUNTER
AIDAN messages and a My Chart letter sent to pt to reschedule appt from 6/29 to a different date with Dr Leary. AIDAN again today advising pt not to show up for this appt tomorrow because this appt is cancelled.

## 2022-07-25 ENCOUNTER — MYC MEDICAL ADVICE (OUTPATIENT)
Dept: FAMILY MEDICINE | Facility: CLINIC | Age: 52
End: 2022-07-25

## 2022-07-25 DIAGNOSIS — J45.40 MODERATE PERSISTENT ASTHMA WITHOUT COMPLICATION: ICD-10-CM

## 2022-07-28 NOTE — TELEPHONE ENCOUNTER
Routing refill request to provider for review/approval because:No protocol avail  Does have upcoming scheduled visit with Dr. Yeager 9/21/22  Aimee Ramon RN

## 2022-08-08 DIAGNOSIS — J45.30 MILD PERSISTENT ASTHMA WITHOUT COMPLICATION: ICD-10-CM

## 2022-08-10 RX ORDER — MONTELUKAST SODIUM 10 MG/1
TABLET ORAL
Qty: 90 TABLET | Refills: 0 | Status: SHIPPED | OUTPATIENT
Start: 2022-08-10 | End: 2022-11-11

## 2022-08-10 NOTE — TELEPHONE ENCOUNTER
Routing refill request to provider for review/approval because:    Leukotriene Inhibitors Protocol Failed     Protocol Details  Asthma control assessment score within normal limits in last 6 months         FYI- pt has future appt with doctor Shobha 09/21.

## 2022-09-02 DIAGNOSIS — J45.40 MODERATE PERSISTENT ASTHMA WITHOUT COMPLICATION: ICD-10-CM

## 2022-09-02 NOTE — TELEPHONE ENCOUNTER
Routing refill request to provider for review/approval because:  Drug not on the [facility] refill protocol . Patient was a patient of Silvina Harvey and has upcoming appointment this month with you to re establish care.     He is asking for 11 refills.     Zahida Bauer RN  [facility]

## 2022-10-22 ENCOUNTER — HEALTH MAINTENANCE LETTER (OUTPATIENT)
Age: 52
End: 2022-10-22

## 2022-11-11 ENCOUNTER — VIRTUAL VISIT (OUTPATIENT)
Dept: FAMILY MEDICINE | Facility: CLINIC | Age: 52
End: 2022-11-11
Payer: COMMERCIAL

## 2022-11-11 DIAGNOSIS — E78.5 HYPERLIPIDEMIA LDL GOAL <160: ICD-10-CM

## 2022-11-11 DIAGNOSIS — J45.30 MILD PERSISTENT ASTHMA WITHOUT COMPLICATION: Primary | ICD-10-CM

## 2022-11-11 DIAGNOSIS — B35.6 TINEA CRURIS: ICD-10-CM

## 2022-11-11 PROCEDURE — 99213 OFFICE O/P EST LOW 20 MIN: CPT | Mod: GT | Performed by: INTERNAL MEDICINE

## 2022-11-11 RX ORDER — MONTELUKAST SODIUM 10 MG/1
10 TABLET ORAL AT BEDTIME
Qty: 90 TABLET | Refills: 3 | Status: SHIPPED | OUTPATIENT
Start: 2022-11-11 | End: 2023-11-08

## 2022-11-11 RX ORDER — TRIAMCINOLONE ACETONIDE 1 MG/G
CREAM TOPICAL
Qty: 30 G | Refills: 1 | Status: SHIPPED | OUTPATIENT
Start: 2022-11-11 | End: 2024-08-06

## 2022-11-11 ASSESSMENT — ASTHMA QUESTIONNAIRES
ACT_TOTALSCORE: 24
QUESTION_4 LAST FOUR WEEKS HOW OFTEN HAVE YOU USED YOUR RESCUE INHALER OR NEBULIZER MEDICATION (SUCH AS ALBUTEROL): ONCE A WEEK OR LESS
QUESTION_3 LAST FOUR WEEKS HOW OFTEN DID YOUR ASTHMA SYMPTOMS (WHEEZING, COUGHING, SHORTNESS OF BREATH, CHEST TIGHTNESS OR PAIN) WAKE YOU UP AT NIGHT OR EARLIER THAN USUAL IN THE MORNING: NOT AT ALL
ACT_TOTALSCORE: 24
QUESTION_5 LAST FOUR WEEKS HOW WOULD YOU RATE YOUR ASTHMA CONTROL: COMPLETELY CONTROLLED
QUESTION_1 LAST FOUR WEEKS HOW MUCH OF THE TIME DID YOUR ASTHMA KEEP YOU FROM GETTING AS MUCH DONE AT WORK, SCHOOL OR AT HOME: NONE OF THE TIME
QUESTION_2 LAST FOUR WEEKS HOW OFTEN HAVE YOU HAD SHORTNESS OF BREATH: NOT AT ALL

## 2022-11-11 NOTE — PROGRESS NOTES
Geo is a 51 year old who is being evaluated via a billable video visit.      How would you like to obtain your AVS? MyChart  If the video visit is dropped, the invitation should be resent by: Text to cell phone: [phone]  Will anyone else be joining your video visit? No      Assessment & Plan     Mild persistent asthma without complication  Continue inhalers  Refill singulair  Well controlled  - montelukast (SINGULAIR) 10 MG tablet  Dispense: 90 tablet; Refill: 3    Tinea cruris  Uses OTC antifungal mixed with TCL which is effective. Does not use daily, prn. Refill approved:  - triamcinolone (KENALOG) 0.1 % external cream  Dispense: 30 g; Refill: 1    Hyperlipidemia LDL goal <160  Reviewed historical high cholesterol. Recommend in office physical in three months. Consider statin if LDL >160        Return in about 3 months (around 2/11/2023) for Routine preventive, with me, in person, sooner if symptoms worsen or do not improve.    Susanna Hernández DO  [facility]   Geo is a 51 year old, presenting for the following health issues:    Geo presents to establish care.  Refills on singulair and tcl.  He saw pulm for his asthma which was optimized. Doing well on inhalers.   TCL mixed with antifungal helps with occasional jock itch.   No other concerns  Will plan to schedule in office physical soon.    Review of Systems   Constitutional, HEENT, cardiovascular, pulmonary, gi and gu systems are negative, except as otherwise noted.      Objective           Vitals:  No vitals were obtained today due to virtual visit.    Physical Exam   GEN: No acute distress  RESP: No audible increased work of breathing. Patient speaking in full sentences without distress.  PSYCH: pleasant  Exam otherwise limited due to virtual platform          Video-Visit Details    Video Start Time: 3:20 PM    Type of service:  Video Visit    Video End Time:3:32 PM    Originating Location (pt. Location):  Home    Distant Location (provider location):  On-site    Platform used for Video Visit: Brittney

## 2022-12-05 ENCOUNTER — E-VISIT (OUTPATIENT)
Dept: FAMILY MEDICINE | Facility: CLINIC | Age: 52
End: 2022-12-05
Payer: COMMERCIAL

## 2022-12-05 DIAGNOSIS — J01.90 ACUTE BACTERIAL SINUSITIS: Primary | ICD-10-CM

## 2022-12-05 DIAGNOSIS — B96.89 ACUTE BACTERIAL SINUSITIS: Primary | ICD-10-CM

## 2022-12-05 PROCEDURE — 99421 OL DIG E/M SVC 5-10 MIN: CPT | Performed by: INTERNAL MEDICINE

## 2022-12-05 RX ORDER — PREDNISONE 20 MG/1
TABLET ORAL
Qty: 20 TABLET | Refills: 0 | Status: SHIPPED | OUTPATIENT
Start: 2022-12-05 | End: 2023-09-19

## 2022-12-05 NOTE — PATIENT INSTRUCTIONS
Antibiotics and prednisone course have been sent to your pharmacy.  If ineffective or worsening, please seek immediate medical attention

## 2022-12-30 ENCOUNTER — E-VISIT (OUTPATIENT)
Dept: FAMILY MEDICINE | Facility: CLINIC | Age: 52
End: 2022-12-30
Payer: COMMERCIAL

## 2022-12-30 DIAGNOSIS — B96.89 ACUTE BACTERIAL SINUSITIS: Primary | ICD-10-CM

## 2022-12-30 DIAGNOSIS — J01.90 ACUTE BACTERIAL SINUSITIS: Primary | ICD-10-CM

## 2022-12-30 PROCEDURE — 99421 OL DIG E/M SVC 5-10 MIN: CPT | Performed by: INTERNAL MEDICINE

## 2022-12-30 RX ORDER — PREDNISONE 20 MG/1
TABLET ORAL
Qty: 20 TABLET | Refills: 0 | Status: SHIPPED | OUTPATIENT
Start: 2022-12-30 | End: 2023-09-19

## 2022-12-30 NOTE — PATIENT INSTRUCTIONS
Dear Bobby Alexandre    After reviewing your responses, I've been able to diagnose you with Acute bacterial sinusitis.      Based on your responses and diagnosis, I have prescribed   Orders Placed This Encounter     amoxicillin-clavulanate (AUGMENTIN) 875-125 MG tablet     predniSONE (DELTASONE) 20 MG tablet    to treat your symptoms. I have sent this to your pharmacy.?     It is also important to stay well hydrated, get lots of rest and take over-the-counter decongestants,?tylenol?or ibuprofen if you?are able to?take those medications per your primary care provider to help relieve discomfort.?     It is important that you take?all of?your prescribed medication even if your symptoms are improving after a few doses.? Taking?all of?your medicine helps prevent the symptoms from returning.?     If your symptoms worsen, you develop severe headache, vomiting, high fever (>102), or are not improving in 7 days, please contact your primary care provider for an appointment or visit any of our convenient Walk-in Care or Urgent Care Centers to be seen which can be found on our website?here.?     Thanks again for choosing?us?as your health care partner,?   ?  Susanna Hernández, DO?

## 2023-06-18 ENCOUNTER — HEALTH MAINTENANCE LETTER (OUTPATIENT)
Age: 53
End: 2023-06-18

## 2023-08-29 DIAGNOSIS — J45.40 MODERATE PERSISTENT ASTHMA WITHOUT COMPLICATION: ICD-10-CM

## 2023-08-29 RX ORDER — FLUTICASONE FUROATE, UMECLIDINIUM BROMIDE AND VILANTEROL TRIFENATATE 200; 62.5; 25 UG/1; UG/1; UG/1
1 POWDER RESPIRATORY (INHALATION) DAILY
Qty: 60 EACH | Refills: 1 | Status: SHIPPED | OUTPATIENT
Start: 2023-08-29 | End: 2023-09-19

## 2023-08-29 NOTE — TELEPHONE ENCOUNTER
Patient called and is needing a refill of his inhaler. Patient will be leaving for [place] on Monday and will need the prescription by then. Routing to refill pool for approval.    Myriam HERNADEZ RN  [facility] Triage Team

## 2023-09-19 ENCOUNTER — OFFICE VISIT (OUTPATIENT)
Dept: FAMILY MEDICINE | Facility: CLINIC | Age: 53
End: 2023-09-19
Payer: COMMERCIAL

## 2023-09-19 VITALS
RESPIRATION RATE: 16 BRPM | HEIGHT: 75 IN | DIASTOLIC BLOOD PRESSURE: 72 MMHG | TEMPERATURE: 97.8 F | HEART RATE: 88 BPM | SYSTOLIC BLOOD PRESSURE: 104 MMHG | BODY MASS INDEX: 32.58 KG/M2 | OXYGEN SATURATION: 95 % | WEIGHT: 262 LBS

## 2023-09-19 DIAGNOSIS — D69.6 THROMBOCYTOPENIA (H): ICD-10-CM

## 2023-09-19 DIAGNOSIS — Z23 NEED FOR PROPHYLACTIC VACCINATION AND INOCULATION AGAINST INFLUENZA: ICD-10-CM

## 2023-09-19 DIAGNOSIS — R79.89 ELEVATED TSH: ICD-10-CM

## 2023-09-19 DIAGNOSIS — B35.6 TINEA CRURIS: ICD-10-CM

## 2023-09-19 DIAGNOSIS — J45.40 MODERATE PERSISTENT ASTHMA WITHOUT COMPLICATION: Primary | ICD-10-CM

## 2023-09-19 DIAGNOSIS — E78.5 HYPERLIPIDEMIA LDL GOAL <160: ICD-10-CM

## 2023-09-19 DIAGNOSIS — J45.30 MILD PERSISTENT ASTHMA WITHOUT COMPLICATION: ICD-10-CM

## 2023-09-19 LAB
ANION GAP SERPL CALCULATED.3IONS-SCNC: 11 MMOL/L (ref 7–15)
BUN SERPL-MCNC: 15 MG/DL (ref 6–20)
CALCIUM SERPL-MCNC: 9.6 MG/DL (ref 8.6–10)
CHLORIDE SERPL-SCNC: 101 MMOL/L (ref 98–107)
CHOLEST SERPL-MCNC: 230 MG/DL
CREAT SERPL-MCNC: 1.06 MG/DL (ref 0.67–1.17)
DEPRECATED HCO3 PLAS-SCNC: 25 MMOL/L (ref 22–29)
EGFRCR SERPLBLD CKD-EPI 2021: 84 ML/MIN/1.73M2
GLUCOSE SERPL-MCNC: 106 MG/DL (ref 70–99)
HDLC SERPL-MCNC: 43 MG/DL
LDLC SERPL CALC-MCNC: 141 MG/DL
NONHDLC SERPL-MCNC: 187 MG/DL
POTASSIUM SERPL-SCNC: 4.7 MMOL/L (ref 3.4–5.3)
SODIUM SERPL-SCNC: 137 MMOL/L (ref 136–145)
TRIGL SERPL-MCNC: 231 MG/DL
TSH SERPL DL<=0.005 MIU/L-ACNC: 3.97 UIU/ML (ref 0.3–4.2)

## 2023-09-19 PROCEDURE — 80061 LIPID PANEL: CPT | Performed by: INTERNAL MEDICINE

## 2023-09-19 PROCEDURE — 90471 IMMUNIZATION ADMIN: CPT | Performed by: INTERNAL MEDICINE

## 2023-09-19 PROCEDURE — 36415 COLL VENOUS BLD VENIPUNCTURE: CPT | Performed by: INTERNAL MEDICINE

## 2023-09-19 PROCEDURE — 84443 ASSAY THYROID STIM HORMONE: CPT | Performed by: INTERNAL MEDICINE

## 2023-09-19 PROCEDURE — 90682 RIV4 VACC RECOMBINANT DNA IM: CPT | Performed by: INTERNAL MEDICINE

## 2023-09-19 PROCEDURE — 80048 BASIC METABOLIC PNL TOTAL CA: CPT | Performed by: INTERNAL MEDICINE

## 2023-09-19 PROCEDURE — 99214 OFFICE O/P EST MOD 30 MIN: CPT | Mod: 25 | Performed by: INTERNAL MEDICINE

## 2023-09-19 RX ORDER — TRIAMCINOLONE ACETONIDE 1 MG/G
CREAM TOPICAL
Qty: 30 G | Refills: 1 | Status: CANCELLED | OUTPATIENT
Start: 2023-09-19

## 2023-09-19 RX ORDER — MONTELUKAST SODIUM 10 MG/1
10 TABLET ORAL AT BEDTIME
Qty: 90 TABLET | Refills: 3 | Status: CANCELLED | OUTPATIENT
Start: 2023-09-19

## 2023-09-19 RX ORDER — CLOTRIMAZOLE AND BETAMETHASONE DIPROPIONATE 10; .64 MG/G; MG/G
CREAM TOPICAL 2 TIMES DAILY
Qty: 45 G | Refills: 0 | Status: SHIPPED | OUTPATIENT
Start: 2023-09-19

## 2023-09-19 RX ORDER — FLUTICASONE FUROATE, UMECLIDINIUM BROMIDE AND VILANTEROL TRIFENATATE 200; 62.5; 25 UG/1; UG/1; UG/1
1 POWDER RESPIRATORY (INHALATION) DAILY
Qty: 90 EACH | Refills: 1 | Status: SHIPPED | OUTPATIENT
Start: 2023-09-19 | End: 2024-01-02

## 2023-09-19 ASSESSMENT — PAIN SCALES - GENERAL: PAINLEVEL: NO PAIN (0)

## 2023-09-19 ASSESSMENT — ASTHMA QUESTIONNAIRES: ACT_TOTALSCORE: 25

## 2023-09-19 NOTE — PROGRESS NOTES
"  Assessment & Plan     Moderate persistent asthma without complication  Marked improvement with Trelegy.  Unclear if Singulair is really adding much.  We will trial off of it.  Patient has an upcoming physical already scheduled.    Hyperlipidemia LDL goal <160  Per patient request we will check lipids today.    Elevated TSH  In the past.  Had normalized on last TSH check.  We will check again today.    Thrombocytopenia (H)  Last platelet count reviewed.  No obvious bruising.  Can check labs at his physical.    Tinea cruris  He has been using triamcinolone with over-the-counter Lotrimin.  I suggested combination Lotrisone.  Patient is agreeable.  Prescription sent.               BMI:   Estimated body mass index is 32.75 kg/m  as calculated from the following:    Height as of this encounter: 1.905 m (6' 3\").    Weight as of this encounter: 118.8 kg (262 lb).           Femi Villar MD  [facility] DOMENIC Guardado is a 52 year old, presenting for the following health issues:  Establish Care    New to me.  Was seeing Dr. Hernández    Asthma:  Medications noted.  Had worsening respiratory sx after Covid in 2020.  Again in 2021.  Had Pulm consult in 2022 when Trelegy was introduced.  He notes atopy as a child and asthma sx.    Not using rescue inhaler.  Able to exercise regularly.      Rash:  Gets jock itch periodically.  Uses on occasion.          History of Present Illness       Reason for visit:  New doctor visit to get drug refill    He eats 0-1 servings of fruits and vegetables daily.He consumes 0 sweetened beverage(s) daily.He exercises with enough effort to increase his heart rate 20 to 29 minutes per day.  He exercises with enough effort to increase his heart rate 3 or less days per week.   He is taking medications regularly.         Review of Systems         Objective    /72 (BP Location: Right arm, Patient Position: Chair, Cuff Size: Adult Large)   Pulse 88   Temp 97.8  F (36.6 " " C) (Temporal)   Resp 16   Ht 1.905 m (6' 3\")   Wt 118.8 kg (262 lb)   SpO2 95%   BMI 32.75 kg/m    Body mass index is 32.75 kg/m .  Physical Exam   GEN NAD  ENT MMM no lesions  CHEST CTA B  CV RRR  ABD obese                      "

## 2023-11-05 ASSESSMENT — ENCOUNTER SYMPTOMS
HEADACHES: 0
HEMATOCHEZIA: 0
DYSURIA: 0
FEVER: 0
HEARTBURN: 0
NAUSEA: 0
JOINT SWELLING: 0
HEMATURIA: 0
MYALGIAS: 0
CHILLS: 0
EYE PAIN: 0
NERVOUS/ANXIOUS: 0
ABDOMINAL PAIN: 0
WEAKNESS: 0
ARTHRALGIAS: 0
SHORTNESS OF BREATH: 0
CONSTIPATION: 0
SORE THROAT: 0
FREQUENCY: 0
PALPITATIONS: 0
DIZZINESS: 0
DIARRHEA: 0
COUGH: 0
PARESTHESIAS: 0

## 2023-11-08 ENCOUNTER — OFFICE VISIT (OUTPATIENT)
Dept: FAMILY MEDICINE | Facility: CLINIC | Age: 53
End: 2023-11-08
Payer: COMMERCIAL

## 2023-11-08 VITALS
WEIGHT: 264 LBS | OXYGEN SATURATION: 97 % | BODY MASS INDEX: 32.83 KG/M2 | RESPIRATION RATE: 16 BRPM | SYSTOLIC BLOOD PRESSURE: 129 MMHG | HEIGHT: 75 IN | HEART RATE: 78 BPM | DIASTOLIC BLOOD PRESSURE: 83 MMHG | TEMPERATURE: 97.5 F

## 2023-11-08 DIAGNOSIS — J45.30 MILD PERSISTENT ASTHMA WITHOUT COMPLICATION: ICD-10-CM

## 2023-11-08 DIAGNOSIS — Z00.00 ENCOUNTER FOR PREVENTIVE CARE: Primary | ICD-10-CM

## 2023-11-08 LAB
BASOPHILS # BLD AUTO: 0 10E3/UL (ref 0–0.2)
BASOPHILS NFR BLD AUTO: 1 %
EOSINOPHIL # BLD AUTO: 0.2 10E3/UL (ref 0–0.7)
EOSINOPHIL NFR BLD AUTO: 3 %
ERYTHROCYTE [DISTWIDTH] IN BLOOD BY AUTOMATED COUNT: 12.4 % (ref 10–15)
HBA1C MFR BLD: 5.4 % (ref 0–5.6)
HCT VFR BLD AUTO: 45.9 % (ref 40–53)
HGB BLD-MCNC: 15.5 G/DL (ref 13.3–17.7)
IMM GRANULOCYTES # BLD: 0 10E3/UL
IMM GRANULOCYTES NFR BLD: 0 %
LYMPHOCYTES # BLD AUTO: 1.1 10E3/UL (ref 0.8–5.3)
LYMPHOCYTES NFR BLD AUTO: 19 %
MCH RBC QN AUTO: 29.1 PG (ref 26.5–33)
MCHC RBC AUTO-ENTMCNC: 33.8 G/DL (ref 31.5–36.5)
MCV RBC AUTO: 86 FL (ref 78–100)
MONOCYTES # BLD AUTO: 0.5 10E3/UL (ref 0–1.3)
MONOCYTES NFR BLD AUTO: 9 %
NEUTROPHILS # BLD AUTO: 3.9 10E3/UL (ref 1.6–8.3)
NEUTROPHILS NFR BLD AUTO: 68 %
PLATELET # BLD AUTO: 149 10E3/UL (ref 150–450)
PSA SERPL DL<=0.01 NG/ML-MCNC: 0.71 NG/ML (ref 0–3.5)
RBC # BLD AUTO: 5.32 10E6/UL (ref 4.4–5.9)
WBC # BLD AUTO: 5.7 10E3/UL (ref 4–11)

## 2023-11-08 PROCEDURE — 36415 COLL VENOUS BLD VENIPUNCTURE: CPT | Performed by: INTERNAL MEDICINE

## 2023-11-08 PROCEDURE — 85025 COMPLETE CBC W/AUTO DIFF WBC: CPT | Performed by: INTERNAL MEDICINE

## 2023-11-08 PROCEDURE — G0103 PSA SCREENING: HCPCS | Performed by: INTERNAL MEDICINE

## 2023-11-08 PROCEDURE — 99396 PREV VISIT EST AGE 40-64: CPT | Mod: 25 | Performed by: INTERNAL MEDICINE

## 2023-11-08 PROCEDURE — 83036 HEMOGLOBIN GLYCOSYLATED A1C: CPT | Performed by: INTERNAL MEDICINE

## 2023-11-08 RX ORDER — MONTELUKAST SODIUM 10 MG/1
10 TABLET ORAL AT BEDTIME
Qty: 90 TABLET | Refills: 3 | Status: SHIPPED | OUTPATIENT
Start: 2023-11-08

## 2023-11-08 ASSESSMENT — ENCOUNTER SYMPTOMS
FEVER: 0
SHORTNESS OF BREATH: 0
HEARTBURN: 0
WEAKNESS: 0
MYALGIAS: 0
EYE PAIN: 0
FREQUENCY: 0
CONSTIPATION: 0
NERVOUS/ANXIOUS: 0
HEMATOCHEZIA: 0
PARESTHESIAS: 0
DIARRHEA: 0
ABDOMINAL PAIN: 0
PALPITATIONS: 0
CHILLS: 0
SORE THROAT: 0
ARTHRALGIAS: 0
JOINT SWELLING: 0
DIZZINESS: 0
DYSURIA: 0
HEMATURIA: 0
NAUSEA: 0
COUGH: 0
HEADACHES: 0

## 2023-11-08 ASSESSMENT — PAIN SCALES - GENERAL: PAINLEVEL: NO PAIN (0)

## 2023-11-08 NOTE — PROGRESS NOTES
"SUBJECTIVE:   CC: Geo is an 52 year old who presents for preventative health visit.     After last visit he did stop Singulair noted increased chest tightness.  He therefore resumed.    He does mention that glucose tends to run high.    Healthy Habits:     Getting at least 3 servings of Calcium per day:  Yes    Bi-annual eye exam:  Yes    Dental care twice a year:  Yes    Sleep apnea or symptoms of sleep apnea:  Excessive snoring    Diet:  Breakfast skipped    Frequency of exercise:  2-3 days/week    Duration of exercise:  15-30 minutes    Taking medications regularly:  Yes    Medication side effects:  None    Additional concerns today:  No            Have you ever done Advance Care Planning? (For example, a Health Directive, POLST, or a discussion with a medical provider or your loved ones about your wishes): Yes, patient states has an Advance Care Planning document and will bring a copy to the clinic.    Social History     Tobacco Use    Smoking status: Never    Smokeless tobacco: Never   Substance Use Topics    Alcohol use: Yes     Comment: 4-5 drinks per week             11/5/2023    11:21 AM   Alcohol Use   Prescreen: >3 drinks/day or >7 drinks/week? No          No data to display                Last PSA: No results found for: \"PSA\"    Reviewed orders with patient. Reviewed health maintenance and updated orders accordingly - Yes  Lab work is in process    Reviewed and updated as needed this visit by clinical staff   Tobacco  Allergies  Meds              Reviewed and updated as needed this visit by Provider                     Review of Systems   Constitutional:  Negative for chills and fever.   HENT:  Negative for congestion, ear pain, hearing loss and sore throat.    Eyes:  Negative for pain and visual disturbance.   Respiratory:  Negative for cough and shortness of breath.    Cardiovascular:  Negative for chest pain, palpitations and peripheral edema.   Gastrointestinal:  Negative for abdominal pain, " "constipation, diarrhea, heartburn, hematochezia and nausea.   Genitourinary:  Negative for dysuria, frequency, genital sores, hematuria, impotence, penile discharge and urgency.   Musculoskeletal:  Negative for arthralgias, joint swelling and myalgias.   Skin:  Negative for rash.   Neurological:  Negative for dizziness, weakness, headaches and paresthesias.   Psychiatric/Behavioral:  Negative for mood changes. The patient is not nervous/anxious.          OBJECTIVE:   /83 (BP Location: Left arm, Patient Position: Sitting, Cuff Size: Adult Large)   Pulse 78   Temp 97.5  F (36.4  C)   Resp 16   Ht 1.905 m (6' 3\")   Wt 119.7 kg (264 lb)   SpO2 97%   BMI 33.00 kg/m    Wt Readings from Last 3 Encounters:   11/08/23 119.7 kg (264 lb)   09/19/23 118.8 kg (262 lb)   03/18/22 119.2 kg (262 lb 11.2 oz)         Physical Exam  GENERAL: healthy, alert and no distress  EYES: Eyes grossly normal to inspection, PERRL and conjunctivae and sclerae normal  HENT: ear canals and TM's normal, nose and mouth without ulcers or lesions  NECK: no adenopathy, no asymmetry, masses, or scars and thyroid normal to palpation  RESP: lungs clear to auscultation - no rales, rhonchi or wheezes  CV: regular rate and rhythm, normal S1 S2, no S3 or S4, no murmur, click or rub, no peripheral edema and peripheral pulses strong  ABDOMEN: soft, nontender, no hepatosplenomegaly, no masses and bowel sounds normal  MS: no gross musculoskeletal defects noted, no edema  SKIN: no suspicious lesions or rashes  NEURO: Normal strength and tone, mentation intact and speech normal  PSYCH: mentation appears normal, affect normal/bright        ASSESSMENT/PLAN:       ICD-10-CM    1. Encounter for preventive care  Z00.00 PSA, screen     CBC with platelets and differential     Hemoglobin A1c        Age and gender appropriate preventive care and screenings are discussed.  Particular attention to personal preventive care and age appropriate lifestyle including " "the incorporation of healthy diet and physical activity is made.        We will check an A1c on this patient with the BMI of 33 and past labs consistent with hyperglycemia.    Singulair is refilled as he is deriving benefit from it.    Patient declines a COVID-vaccine today.        COUNSELING:   Reviewed preventive health counseling, as reflected in patient instructions      BMI:   Estimated body mass index is 33 kg/m  as calculated from the following:    Height as of this encounter: 1.905 m (6' 3\").    Weight as of this encounter: 119.7 kg (264 lb).         He reports that he has never smoked. He has never used smokeless tobacco.            Femi Villar MD  [facility]  "

## 2023-11-13 NOTE — TELEPHONE ENCOUNTER
Outpatient Medication Detail     Disp Refills Start End JEFFERY   montelukast (SINGULAIR) 10 MG tablet 90 tablet 3 11/8/2023  No   Sig - Route: Take 1 tablet (10 mg) by mouth at bedtime - Oral   Sent to pharmacy as: Montelukast Sodium 10 MG Oral Tablet (SINGULAIR)   Class: E-Prescribe   Order: [id number]   E-Prescribing Status: Receipt confirmed by pharmacy (11/8/2023  7:15 AM CST)       Pharmacy    [id number] [place], [place] - [address]       Pharmacy seeking refill of montelukast.  Last Rx'd on 11-8-2023 good for one year .    Too soon to renew.    Fax sent back to pharmacy - check records for Rx; not due to renew.      Farida Campo, RT (R)

## 2023-12-31 ENCOUNTER — MYC MEDICAL ADVICE (OUTPATIENT)
Dept: FAMILY MEDICINE | Facility: CLINIC | Age: 53
End: 2023-12-31
Payer: COMMERCIAL

## 2023-12-31 DIAGNOSIS — J45.40 MODERATE PERSISTENT ASTHMA WITHOUT COMPLICATION: ICD-10-CM

## 2024-01-02 RX ORDER — FLUTICASONE FUROATE, UMECLIDINIUM BROMIDE AND VILANTEROL TRIFENATATE 200; 62.5; 25 UG/1; UG/1; UG/1
1 POWDER RESPIRATORY (INHALATION) DAILY
Qty: 1 EACH | Refills: 12 | OUTPATIENT
Start: 2024-01-02

## 2024-01-02 RX ORDER — FLUTICASONE FUROATE, UMECLIDINIUM BROMIDE AND VILANTEROL TRIFENATATE 200; 62.5; 25 UG/1; UG/1; UG/1
1 POWDER RESPIRATORY (INHALATION) DAILY
Qty: 3 EACH | Refills: 11 | Status: SHIPPED | OUTPATIENT
Start: 2024-01-02

## 2024-06-27 ENCOUNTER — E-VISIT (OUTPATIENT)
Dept: FAMILY MEDICINE | Facility: CLINIC | Age: 54
End: 2024-06-27
Payer: COMMERCIAL

## 2024-06-27 DIAGNOSIS — B96.89 ACUTE BACTERIAL SINUSITIS: Primary | ICD-10-CM

## 2024-06-27 DIAGNOSIS — J01.90 ACUTE BACTERIAL SINUSITIS: Primary | ICD-10-CM

## 2024-06-27 PROCEDURE — 99421 OL DIG E/M SVC 5-10 MIN: CPT | Performed by: INTERNAL MEDICINE

## 2024-08-05 ASSESSMENT — ASTHMA QUESTIONNAIRES
ACT_TOTALSCORE: 25
QUESTION_2 LAST FOUR WEEKS HOW OFTEN HAVE YOU HAD SHORTNESS OF BREATH: NOT AT ALL
ACT_TOTALSCORE: 25
QUESTION_1 LAST FOUR WEEKS HOW MUCH OF THE TIME DID YOUR ASTHMA KEEP YOU FROM GETTING AS MUCH DONE AT WORK, SCHOOL OR AT HOME: NONE OF THE TIME
QUESTION_4 LAST FOUR WEEKS HOW OFTEN HAVE YOU USED YOUR RESCUE INHALER OR NEBULIZER MEDICATION (SUCH AS ALBUTEROL): NOT AT ALL
QUESTION_5 LAST FOUR WEEKS HOW WOULD YOU RATE YOUR ASTHMA CONTROL: COMPLETELY CONTROLLED
QUESTION_3 LAST FOUR WEEKS HOW OFTEN DID YOUR ASTHMA SYMPTOMS (WHEEZING, COUGHING, SHORTNESS OF BREATH, CHEST TIGHTNESS OR PAIN) WAKE YOU UP AT NIGHT OR EARLIER THAN USUAL IN THE MORNING: NOT AT ALL

## 2024-08-06 ENCOUNTER — VIRTUAL VISIT (OUTPATIENT)
Dept: FAMILY MEDICINE | Facility: CLINIC | Age: 54
End: 2024-08-06
Payer: COMMERCIAL

## 2024-08-06 DIAGNOSIS — E66.811 CLASS 1 OBESITY DUE TO EXCESS CALORIES WITHOUT SERIOUS COMORBIDITY WITH BODY MASS INDEX (BMI) OF 33.0 TO 33.9 IN ADULT: Primary | ICD-10-CM

## 2024-08-06 DIAGNOSIS — E66.09 CLASS 1 OBESITY DUE TO EXCESS CALORIES WITHOUT SERIOUS COMORBIDITY WITH BODY MASS INDEX (BMI) OF 33.0 TO 33.9 IN ADULT: Primary | ICD-10-CM

## 2024-08-06 PROCEDURE — 99213 OFFICE O/P EST LOW 20 MIN: CPT | Mod: 95 | Performed by: INTERNAL MEDICINE

## 2024-08-06 PROCEDURE — G2211 COMPLEX E/M VISIT ADD ON: HCPCS | Mod: 95 | Performed by: INTERNAL MEDICINE

## 2024-08-06 NOTE — PROGRESS NOTES
Geo is a 53 year old who is being evaluated via a billable video visit.    How would you like to obtain your AVS? MyChart  If the video visit is dropped, the invitation should be resent by: Text to cell phone: [phone]  Will anyone else be joining your video visit? No      Assessment & Plan     Class 1 obesity due to excess calories without serious comorbidity with body mass index (BMI) of 33.0 to 33.9 in adult      Patient is interested in medication.  He has struggled with weight for many years.  Current BMI is noted as below.  He has tried various diets including keto diet and is currently on Noom.      I do believe he is a candidate for GLP-1.  Risks and side effects are discussed.  Options are discussed.    He is not sure whether covered by insurance.  I explained that cost availability and insurance coverage are the biggest barriers.  We will start with a prescription for Wegovy at 0.25 mg weekly and then increase to 0.5 mg weekly.  He should check in once he has been on the 0.5 mg weekly for several weeks.    I did discuss the [facility] compounding option as well.  This would be for semaglutide.    We will go for that as his out-of-pocket cost with Wegovy is too high.  Of note his sister does work for [employer] and he did mention the drugs that found as well.  Given that therapy was compounding only semaglutide we will start with this medication instead.    Patient is also encouraged to schedule a visit in 10 to 12 weeks.  We can do this along with his annual which she typically does in November.    The longitudinal plan of care for the diagnosis(es)/condition(s) as documented were addressed during this visit. Due to the added complexity in care, I will continue to support Geo in the subsequent management and with ongoing continuity of care.    Compounded Wegovy (semaglutide)    Given the national shortage of Wegovy (semaglutide) - our [facility] has started to offer this.  It is a cash  "only business - no insurance.  We do NOT recommend receiving compounded semaglutide from any other source.    They are currently offering prefilled syringes in doses of 0.25mg, 0.5mg, 1mg, 1.5mg, 2mg and 2.5mg.      Prefilled Syringes Pricing #4 each (1month):  0.25mg ~$222  0.5mg ~$260  1mg ~$306  1.5mg ~$342  2mg ~$395  2.5mg ~$438    Compounded semaglutide should be kept in the refrigerator until you are ready to take your dose.  Prior to injecting, you can hold the syringe in your hand to allow the medication to reach body temperature.  See directions below regarding how to inject.    If you need to contact the [facility] - their phone number is [phone].          BMI  Estimated body mass index is 33 kg/m  as calculated from the following:    Height as of 11/8/23: 1.905 m (6' 3\").    Weight as of 11/8/23: 119.7 kg (264 lb).             Michael Guardado is a 53 year old, presenting for the following health issues:  Weight Loss    Pt with obesity.  Has struggled with this for many years.  Has had exceedingly limited successes with calorie restriction and formal weight loss programs including Noom (currently on).  Does keep a food log.      He did lose weight at one point with a 'keto' diet but could not keep it off.      He notes his mother has DM and has ophthalmologic complications from this.     Weight was 180-200 during college years.  230s in early adulthood.      Current weight:262  Current BMI:  32.7    History of Present Illness       Reason for visit:  To discuss GLP-1 as a potential to manage chronic weight issue and to reduce probably of becoming prediabetic    He eats 2-3 servings of fruits and vegetables daily.He consumes 0 sweetened beverage(s) daily.He exercises with enough effort to increase his heart rate 30 to 60 minutes per day.  He exercises with enough effort to increase his heart rate 3 or less days per week.   He is taking medications regularly.            "   Objective           Vitals:  No vitals were obtained today due to virtual visit.  Wt Readings from Last 3 Encounters:   11/08/23 119.7 kg (264 lb)   09/19/23 118.8 kg (262 lb)   03/18/22 119.2 kg (262 lb 11.2 oz)       Physical Exam   GENERAL: alert and no distress  EYES: Eyes grossly normal to inspection.  No discharge or erythema, or obvious scleral/conjunctival abnormalities.  RESP: No audible wheeze, cough, or visible cyanosis.    SKIN: Visible skin clear. No significant rash, abnormal pigmentation or lesions.  NEURO: Cranial nerves grossly intact.  Mentation and speech appropriate for age.  PSYCH: Appropriate affect, tone, and pace of words          Video-Visit Details    Type of service:  Video Visit   Originating Location (pt. Location): Home    Distant Location (provider location):  On-site  Platform used for Video Visit: Jose Alejandro  Signed Electronically by: Femi Villar MD

## 2024-08-09 ENCOUNTER — TELEPHONE (OUTPATIENT)
Dept: FAMILY MEDICINE | Facility: CLINIC | Age: 54
End: 2024-08-09
Payer: COMMERCIAL

## 2024-08-09 NOTE — TELEPHONE ENCOUNTER
Prior Authorization Retail Medication Request    Medication/Dose: Wegovy 0.25mg/0.5mL  Diagnosis and ICD code (if different than what is on RX):  E66.09  New/renewal/insurance change PA/secondary ins. PA:  Previously Tried and Failed:  None  Rationale:  Pt with obesity.  Has struggled with this for many years.  Has had exceedingly limited successes with calorie restriction and formal weight loss programs including Noom (currently on).  Does keep a food log.       He did lose weight at one point with a 'keto' diet but could not keep it off.     Current weight:262  Current BMI:  32.7    Insurance   Primary: [employer]  Insurance ID:  [id number]    Secondary (if applicable):  Insurance ID:      Pharmacy Information (if different than what is on RX)  Name:  [id number]  Phone:  [phone]  Fax:[phone]

## 2024-08-12 ENCOUNTER — MYC MEDICAL ADVICE (OUTPATIENT)
Dept: FAMILY MEDICINE | Facility: CLINIC | Age: 54
End: 2024-08-12
Payer: COMMERCIAL

## 2024-08-12 DIAGNOSIS — E66.811 CLASS 1 OBESITY DUE TO EXCESS CALORIES WITHOUT SERIOUS COMORBIDITY WITH BODY MASS INDEX (BMI) OF 33.0 TO 33.9 IN ADULT: Primary | ICD-10-CM

## 2024-08-12 DIAGNOSIS — E66.09 CLASS 1 OBESITY DUE TO EXCESS CALORIES WITHOUT SERIOUS COMORBIDITY WITH BODY MASS INDEX (BMI) OF 33.0 TO 33.9 IN ADULT: Primary | ICD-10-CM

## 2024-08-12 NOTE — TELEPHONE ENCOUNTER
PRIOR AUTHORIZATION DENIED    Medication: Wegovy 0.25mg/0.5mL    Denial Date: 8/12/2024    Denial Rational:  Per insurance, medication is excluded from patient's benefit plan and will not be covered. Review and appeal are not available because of this exclusion.                Appeal Information:  N/A

## 2024-08-12 NOTE — TELEPHONE ENCOUNTER
Central Prior Authorization Team   Phone: [phone]    PA Initiation    Medication: Wegovy 0.25mg/0.5mL  Insurance Company: [employer] ([facility]) - Phone [phone] Fax [phone]  Pharmacy Filling the Rx: [facility] #[id number] - [place], [place] - [address] AT [address] [place] [address] & [facility]  Filling Pharmacy Phone: [phone]  Filling Pharmacy Fax:    Start Date: 8/12/2024

## 2024-08-13 NOTE — TELEPHONE ENCOUNTER
Good day Dr. Villar and team,      Please review patient's MyChart message and advise.    Physical is scheduled for 11/11/2024 at 7:40 am.      Beatriz ROMAN MA on 8/13/2024 at 10:55 AM

## 2024-08-19 NOTE — TELEPHONE ENCOUNTER
Prescription sent to [facility] pharmacy.    Patient can start 0.25 mg weekly x 4 weeks.  Then he will increase to 0.5 mg weekly until seen next.  Femi Villar MD on 8/19/2024 at 4:09 PM

## 2024-10-12 DIAGNOSIS — E66.09 CLASS 1 OBESITY DUE TO EXCESS CALORIES WITHOUT SERIOUS COMORBIDITY WITH BODY MASS INDEX (BMI) OF 33.0 TO 33.9 IN ADULT: ICD-10-CM

## 2024-10-12 DIAGNOSIS — E66.811 CLASS 1 OBESITY DUE TO EXCESS CALORIES WITHOUT SERIOUS COMORBIDITY WITH BODY MASS INDEX (BMI) OF 33.0 TO 33.9 IN ADULT: ICD-10-CM

## 2024-10-14 NOTE — TELEPHONE ENCOUNTER
Refused Prescriptions     COMPOUNDED NON-CONTROLLED SUBSTANCE (CMPD RX) - PHARMACY TO MIX COMPOUNDED MEDICATION         Sig: Semaglutide 0.25 mg injected weekly x 4 weeks. Compound due to national shortage    Disp: 4 each    Refills: 0    Start: 10/14/2024    Class: E-Prescribe    Refused by: Femi Villar MD    Refusal reason: Adjustment in Therapy    Non-formulary        Fill requested from: [facility] - [place], [place] - [address]        Direct # for [phone]    Spoke with pharmacy - pt completed both 0.25mg and 0.5mg     Needs 1mg, advised that Rx was approved already     No further needs at this time     Tania Sanabria RN  [facility]

## 2024-11-15 SDOH — HEALTH STABILITY: PHYSICAL HEALTH: ON AVERAGE, HOW MANY MINUTES DO YOU ENGAGE IN EXERCISE AT THIS LEVEL?: 30 MIN

## 2024-11-15 SDOH — HEALTH STABILITY: PHYSICAL HEALTH: ON AVERAGE, HOW MANY DAYS PER WEEK DO YOU ENGAGE IN MODERATE TO STRENUOUS EXERCISE (LIKE A BRISK WALK)?: 2 DAYS

## 2024-11-15 ASSESSMENT — SOCIAL DETERMINANTS OF HEALTH (SDOH): HOW OFTEN DO YOU GET TOGETHER WITH FRIENDS OR RELATIVES?: TWICE A WEEK

## 2024-11-19 ENCOUNTER — OFFICE VISIT (OUTPATIENT)
Dept: FAMILY MEDICINE | Facility: CLINIC | Age: 54
End: 2024-11-19
Payer: COMMERCIAL

## 2024-11-19 VITALS
RESPIRATION RATE: 16 BRPM | BODY MASS INDEX: 32.33 KG/M2 | HEIGHT: 75 IN | WEIGHT: 260 LBS | HEART RATE: 85 BPM | TEMPERATURE: 97.1 F | OXYGEN SATURATION: 97 % | SYSTOLIC BLOOD PRESSURE: 126 MMHG | DIASTOLIC BLOOD PRESSURE: 89 MMHG

## 2024-11-19 DIAGNOSIS — E78.5 HYPERLIPIDEMIA LDL GOAL <160: ICD-10-CM

## 2024-11-19 DIAGNOSIS — Z23 NEED FOR PROPHYLACTIC VACCINATION AND INOCULATION AGAINST INFLUENZA: ICD-10-CM

## 2024-11-19 DIAGNOSIS — Z00.00 ENCOUNTER FOR PREVENTIVE CARE: Primary | ICD-10-CM

## 2024-11-19 DIAGNOSIS — B35.6 TINEA CRURIS: ICD-10-CM

## 2024-11-19 DIAGNOSIS — J45.40 MODERATE PERSISTENT ASTHMA WITHOUT COMPLICATION: ICD-10-CM

## 2024-11-19 DIAGNOSIS — E66.811 CLASS 1 OBESITY DUE TO EXCESS CALORIES WITHOUT SERIOUS COMORBIDITY WITH BODY MASS INDEX (BMI) OF 33.0 TO 33.9 IN ADULT: ICD-10-CM

## 2024-11-19 DIAGNOSIS — E66.09 CLASS 1 OBESITY DUE TO EXCESS CALORIES WITHOUT SERIOUS COMORBIDITY WITH BODY MASS INDEX (BMI) OF 33.0 TO 33.9 IN ADULT: ICD-10-CM

## 2024-11-19 DIAGNOSIS — D69.6 THROMBOCYTOPENIA (H): ICD-10-CM

## 2024-11-19 LAB
BASOPHILS # BLD AUTO: 0 10E3/UL (ref 0–0.2)
BASOPHILS NFR BLD AUTO: 0 %
EOSINOPHIL # BLD AUTO: 0.1 10E3/UL (ref 0–0.7)
EOSINOPHIL NFR BLD AUTO: 1 %
ERYTHROCYTE [DISTWIDTH] IN BLOOD BY AUTOMATED COUNT: 12.4 % (ref 10–15)
HCT VFR BLD AUTO: 48.5 % (ref 40–53)
HGB BLD-MCNC: 16.4 G/DL (ref 13.3–17.7)
IMM GRANULOCYTES # BLD: 0 10E3/UL
IMM GRANULOCYTES NFR BLD: 0 %
LYMPHOCYTES # BLD AUTO: 1 10E3/UL (ref 0.8–5.3)
LYMPHOCYTES NFR BLD AUTO: 15 %
MCH RBC QN AUTO: 29 PG (ref 26.5–33)
MCHC RBC AUTO-ENTMCNC: 33.8 G/DL (ref 31.5–36.5)
MCV RBC AUTO: 86 FL (ref 78–100)
MONOCYTES # BLD AUTO: 0.5 10E3/UL (ref 0–1.3)
MONOCYTES NFR BLD AUTO: 7 %
NEUTROPHILS # BLD AUTO: 5.5 10E3/UL (ref 1.6–8.3)
NEUTROPHILS NFR BLD AUTO: 77 %
PLATELET # BLD AUTO: 154 10E3/UL (ref 150–450)
RBC # BLD AUTO: 5.65 10E6/UL (ref 4.4–5.9)
WBC # BLD AUTO: 7.1 10E3/UL (ref 4–11)

## 2024-11-19 PROCEDURE — 90673 RIV3 VACCINE NO PRESERV IM: CPT | Performed by: INTERNAL MEDICINE

## 2024-11-19 PROCEDURE — 99396 PREV VISIT EST AGE 40-64: CPT | Mod: 25 | Performed by: INTERNAL MEDICINE

## 2024-11-19 PROCEDURE — 80053 COMPREHEN METABOLIC PANEL: CPT | Performed by: INTERNAL MEDICINE

## 2024-11-19 PROCEDURE — 84443 ASSAY THYROID STIM HORMONE: CPT | Performed by: INTERNAL MEDICINE

## 2024-11-19 PROCEDURE — 85025 COMPLETE CBC W/AUTO DIFF WBC: CPT | Performed by: INTERNAL MEDICINE

## 2024-11-19 PROCEDURE — 80061 LIPID PANEL: CPT | Performed by: INTERNAL MEDICINE

## 2024-11-19 PROCEDURE — 99214 OFFICE O/P EST MOD 30 MIN: CPT | Mod: 25 | Performed by: INTERNAL MEDICINE

## 2024-11-19 PROCEDURE — 36415 COLL VENOUS BLD VENIPUNCTURE: CPT | Performed by: INTERNAL MEDICINE

## 2024-11-19 PROCEDURE — 90471 IMMUNIZATION ADMIN: CPT | Performed by: INTERNAL MEDICINE

## 2024-11-19 RX ORDER — MONTELUKAST SODIUM 10 MG/1
10 TABLET ORAL AT BEDTIME
Qty: 90 TABLET | Refills: 3 | Status: SHIPPED | OUTPATIENT
Start: 2024-11-19

## 2024-11-19 RX ORDER — FLUTICASONE FUROATE, UMECLIDINIUM BROMIDE AND VILANTEROL TRIFENATATE 200; 62.5; 25 UG/1; UG/1; UG/1
1 POWDER RESPIRATORY (INHALATION) DAILY
Qty: 3 EACH | Refills: 11 | Status: SHIPPED | OUTPATIENT
Start: 2024-11-19

## 2024-11-19 RX ORDER — CLOTRIMAZOLE AND BETAMETHASONE DIPROPIONATE 10; .64 MG/G; MG/G
CREAM TOPICAL 2 TIMES DAILY
Qty: 45 G | Refills: 0 | Status: SHIPPED | OUTPATIENT
Start: 2024-11-19

## 2024-11-19 ASSESSMENT — PAIN SCALES - GENERAL: PAINLEVEL_OUTOF10: NO PAIN (0)

## 2024-11-19 NOTE — PROGRESS NOTES
Preventive Care Visit  [facility]  eFmi Villar MD, Internal Medicine  Nov 19, 2024      Assessment & Plan     Encounter for preventive care  Age and gender appropriate preventive care and screenings are discussed.  Particular attention to personal preventive care and age appropriate lifestyle including the incorporation of healthy diet and physical activity is made.      - CBC with Platelets & Differential  - Comprehensive metabolic panel  - TSH with free T4 reflex  - Lipid panel reflex to direct LDL Fasting    Class 1 obesity due to excess calories without serious comorbidity with body mass index (BMI) of 33.0 to 33.9 in adult  He is now on week 5 at 1 mg.  Options are discussed.  Patient wishes to increase the dose.  We will increase to 1.7 mg.  I suggest to check again in 2 to 3 months.  I also discussed the possibility that this particular medication may lose its national shortage designation.    - COMPOUNDED NON-CONTROLLED SUBSTANCE (CMPD RX) - PHARMACY TO MIX COMPOUNDED MEDICATION  Dispense: 4 each; Refill: 5    Tinea cruris  Uses Lotrisone as needed.  Mostly for jock itch.  Stable at present.  Patient would like a refill.  Medication sent.  - clotrimazole-betamethasone (LOTRISONE) 1-0.05 % external cream  Dispense: 45 g; Refill: 0    Moderate persistent asthma without complication  Stable on current regimen.  Patient had no issues over the summer with heat humidity and poor air quality.  Medications are refilled.  - montelukast (SINGULAIR) 10 MG tablet  Dispense: 90 tablet; Refill: 3  - Fluticasone-Umeclidin-Vilanterol (TRELEGY ELLIPTA) 200-62.5-25 MCG/ACT oral inhaler  Dispense: 3 each; Refill: 11    Hyperlipidemia LDL goal <160  Lipids today.  Consider statin depending on ASCVD risk score.    Thrombocytopenia (H)  Not explicitly discussed in clinic.  However it is important to note.  The patient's thrombocytopenia is mild.  Screening labs today.        BMI  Estimated body mass index  "is 32.5 kg/m  as calculated from the following:    Height as of this encounter: 1.905 m (6' 3\").    Weight as of this encounter: 117.9 kg (260 lb).       Counseling  Appropriate preventive services were addressed with this patient via screening, questionnaire, or discussion as appropriate for fall prevention, nutrition, physical activity, Tobacco-use cessation, social engagement, weight loss and cognition.  Checklist reviewing preventive services available has been given to the patient.  Reviewed patient's diet, addressing concerns and/or questions.   He is at risk for lack of exercise and has been provided with information to increase physical activity for the benefit of his well-being.         Subjective   Geo is a 53 year old, presenting for the following:  Physical and Imm/Inj (Flu Shot)        11/19/2024    10:50 AM   Additional Questions   Roomed by Maricarmen MONTENEGRO    Obesity:  We started semaglutide.  He is now on 1mg weekly.  Initial constipation but otherwise no issues. He's lost 8-9 lbs.  Feels more 'neutral to food'.          Health Care Directive  Patient does not have a Health Care Directive: Patient states has Advance Directive and will bring in a copy to clinic.      11/15/2024   General Health   How would you rate your overall physical health? Good   Feel stress (tense, anxious, or unable to sleep) Not at all            11/15/2024   Nutrition   Three or more servings of calcium each day? Yes   Diet: Breakfast skipped   How many servings of fruit and vegetables per day? (!) 0-1   How many sweetened beverages each day? 0-1            11/15/2024   Exercise   Days per week of moderate/strenous exercise 2 days   Average minutes spent exercising at this level 30 min      (!) EXERCISE CONCERN      11/15/2024   Social Factors   Frequency of gathering with friends or relatives Twice a week   Worry food won't last until get money to buy more No   Food not last or not have enough money for food? No   Do you " have housing? (Housing is defined as stable permanent housing and does not include staying ouside in a car, in a tent, in an abandoned building, in an overnight shelter, or couch-surfing.) Yes   Are you worried about losing your housing? No   Lack of transportation? No   Unable to get utilities (heat,electricity)? No            11/15/2024   Fall Risk   Fallen 2 or more times in the past year? No    Trouble with walking or balance? No        Patient-reported          11/15/2024   Dental   Dentist two times every year? Yes            11/15/2024   TB Screening   Were you born outside of the [place]? No              Today's PHQ-2 Score:       8/5/2024     8:49 AM   PHQ-2 ( 1999 [employer])   Q1: Little interest or pleasure in doing things 0    Q2: Feeling down, depressed or hopeless 0    PHQ-2 Score 0   Q1: Little interest or pleasure in doing things Not at all   Q2: Feeling down, depressed or hopeless Not at all   PHQ-2 Score 0       Patient-reported         11/15/2024   Substance Use   Alcohol more than 3/day or more than 7/wk No   Do you use any other substances recreationally? No        Social History     Tobacco Use    Smoking status: Never    Smokeless tobacco: Never   Vaping Use    Vaping status: Never Used   Substance Use Topics    Alcohol use: Yes     Comment: 4-5 drinks per week    Drug use: No             11/15/2024   One time HIV Screening   Previous HIV test? I don't know          11/15/2024   STI Screening   New sexual partner(s) since last STI/HIV test? No      ASCVD Risk   The 10-year ASCVD risk score (Kimani HENRY, et al., 2019) is: 6.2%    Values used to calculate the score:      Age: 53 years      Sex: Male      Is Non- : No      Diabetic: No      Tobacco smoker: No      Systolic Blood Pressure: 126 mmHg      Is BP treated: No      HDL Cholesterol: 43 mg/dL      Total Cholesterol: 230 mg/dL           Reviewed and updated as needed this visit by Provider                            "  Objective    Exam  /89 (BP Location: Right arm, Patient Position: Chair, Cuff Size: Adult Large)   Pulse 85   Temp 97.1  F (36.2  C) (Temporal)   Resp 16   Ht 1.905 m (6' 3\")   Wt 117.9 kg (260 lb)   SpO2 97%   BMI 32.50 kg/m     Estimated body mass index is 32.5 kg/m  as calculated from the following:    Height as of this encounter: 1.905 m (6' 3\").    Weight as of this encounter: 117.9 kg (260 lb).  Wt Readings from Last 3 Encounters:   11/19/24 117.9 kg (260 lb)   11/08/23 119.7 kg (264 lb)   09/19/23 118.8 kg (262 lb)         Physical Exam  GENERAL: alert and no distress  EYES: Eyes grossly normal to inspection, PERRL and conjunctivae and sclerae normal  HENT: ear canals and TM's normal, nose and mouth without ulcers or lesions  NECK: no adenopathy, no asymmetry, masses, or scars  RESP: lungs clear to auscultation - no rales, rhonchi or wheezes  CV: regular rate and rhythm, normal S1 S2, no S3 or S4, no murmur, click or rub, no peripheral edema  ABDOMEN: soft, nontender, no hepatosplenomegaly, no masses and bowel sounds normal  MS: no gross musculoskeletal defects noted, no edema  SKIN: no suspicious lesions or rashes  NEURO: Normal strength and tone, mentation intact and speech normal  PSYCH: mentation appears normal, affect normal/bright        Signed Electronically by: Femi Villar MD    "

## 2024-11-20 DIAGNOSIS — J45.40 MODERATE PERSISTENT ASTHMA WITHOUT COMPLICATION: ICD-10-CM

## 2024-11-20 LAB
ALBUMIN SERPL BCG-MCNC: 4.8 G/DL (ref 3.5–5.2)
ALP SERPL-CCNC: 78 U/L (ref 40–150)
ALT SERPL W P-5'-P-CCNC: 56 U/L (ref 0–70)
ANION GAP SERPL CALCULATED.3IONS-SCNC: 12 MMOL/L (ref 7–15)
AST SERPL W P-5'-P-CCNC: 35 U/L (ref 0–45)
BILIRUB SERPL-MCNC: 0.5 MG/DL
BUN SERPL-MCNC: 11 MG/DL (ref 6–20)
CALCIUM SERPL-MCNC: 10.1 MG/DL (ref 8.8–10.4)
CHLORIDE SERPL-SCNC: 103 MMOL/L (ref 98–107)
CHOLEST SERPL-MCNC: 238 MG/DL
CREAT SERPL-MCNC: 0.93 MG/DL (ref 0.67–1.17)
EGFRCR SERPLBLD CKD-EPI 2021: >90 ML/MIN/1.73M2
FASTING STATUS PATIENT QL REPORTED: YES
FASTING STATUS PATIENT QL REPORTED: YES
GLUCOSE SERPL-MCNC: 93 MG/DL (ref 70–99)
HCO3 SERPL-SCNC: 24 MMOL/L (ref 22–29)
HDLC SERPL-MCNC: 57 MG/DL
LDLC SERPL CALC-MCNC: 159 MG/DL
NONHDLC SERPL-MCNC: 181 MG/DL
POTASSIUM SERPL-SCNC: 4.6 MMOL/L (ref 3.4–5.3)
PROT SERPL-MCNC: 7.3 G/DL (ref 6.4–8.3)
SODIUM SERPL-SCNC: 139 MMOL/L (ref 135–145)
TRIGL SERPL-MCNC: 112 MG/DL
TSH SERPL DL<=0.005 MIU/L-ACNC: 2.9 UIU/ML (ref 0.3–4.2)

## 2024-11-20 RX ORDER — MONTELUKAST SODIUM 10 MG/1
1 TABLET ORAL AT BEDTIME
Qty: 90 TABLET | Refills: 3 | OUTPATIENT
Start: 2024-11-20

## 2025-02-28 ASSESSMENT — ASTHMA QUESTIONNAIRES
QUESTION_1 LAST FOUR WEEKS HOW MUCH OF THE TIME DID YOUR ASTHMA KEEP YOU FROM GETTING AS MUCH DONE AT WORK, SCHOOL OR AT HOME: NONE OF THE TIME
ACT_TOTALSCORE: 24
QUESTION_5 LAST FOUR WEEKS HOW WOULD YOU RATE YOUR ASTHMA CONTROL: COMPLETELY CONTROLLED
QUESTION_3 LAST FOUR WEEKS HOW OFTEN DID YOUR ASTHMA SYMPTOMS (WHEEZING, COUGHING, SHORTNESS OF BREATH, CHEST TIGHTNESS OR PAIN) WAKE YOU UP AT NIGHT OR EARLIER THAN USUAL IN THE MORNING: NOT AT ALL
QUESTION_2 LAST FOUR WEEKS HOW OFTEN HAVE YOU HAD SHORTNESS OF BREATH: NOT AT ALL
ACT_TOTALSCORE: 24
QUESTION_4 LAST FOUR WEEKS HOW OFTEN HAVE YOU USED YOUR RESCUE INHALER OR NEBULIZER MEDICATION (SUCH AS ALBUTEROL): ONCE A WEEK OR LESS

## 2025-03-04 ENCOUNTER — VIRTUAL VISIT (OUTPATIENT)
Dept: FAMILY MEDICINE | Facility: CLINIC | Age: 55
End: 2025-03-04
Payer: COMMERCIAL

## 2025-03-04 DIAGNOSIS — J45.30 MILD PERSISTENT ASTHMA WITHOUT COMPLICATION: ICD-10-CM

## 2025-03-04 DIAGNOSIS — E66.09 CLASS 1 OBESITY DUE TO EXCESS CALORIES WITHOUT SERIOUS COMORBIDITY WITH BODY MASS INDEX (BMI) OF 33.0 TO 33.9 IN ADULT: Primary | ICD-10-CM

## 2025-03-04 DIAGNOSIS — J45.40 MODERATE PERSISTENT ASTHMA WITHOUT COMPLICATION: ICD-10-CM

## 2025-03-04 DIAGNOSIS — Z91.010 PEANUT ALLERGY: ICD-10-CM

## 2025-03-04 DIAGNOSIS — E66.811 CLASS 1 OBESITY DUE TO EXCESS CALORIES WITHOUT SERIOUS COMORBIDITY WITH BODY MASS INDEX (BMI) OF 33.0 TO 33.9 IN ADULT: Primary | ICD-10-CM

## 2025-03-04 PROCEDURE — 1126F AMNT PAIN NOTED NONE PRSNT: CPT | Mod: 95 | Performed by: INTERNAL MEDICINE

## 2025-03-04 PROCEDURE — 98006 SYNCH AUDIO-VIDEO EST MOD 30: CPT | Performed by: INTERNAL MEDICINE

## 2025-03-04 RX ORDER — EPINEPHRINE 0.3 MG/.3ML
0.3 INJECTION SUBCUTANEOUS PRN
Qty: 2 EACH | Refills: 0 | Status: SHIPPED | OUTPATIENT
Start: 2025-03-04

## 2025-03-04 RX ORDER — MONTELUKAST SODIUM 10 MG/1
10 TABLET ORAL AT BEDTIME
Qty: 90 TABLET | Refills: 11 | Status: SHIPPED | OUTPATIENT
Start: 2025-03-04

## 2025-03-04 RX ORDER — ALBUTEROL SULFATE 90 UG/1
2 INHALANT RESPIRATORY (INHALATION) EVERY 6 HOURS PRN
Qty: 18 G | Refills: 4 | Status: SHIPPED | OUTPATIENT
Start: 2025-03-04

## 2025-03-04 NOTE — PROGRESS NOTES
Geo is a 54 year old who is being evaluated via a billable video visit.    How would you like to obtain your AVS? MyChart  If the video visit is dropped, the invitation should be resent by: Text to cell phone: [phone]  Will anyone else be joining your video visit? No      Assessment & Plan     Class 1 obesity due to excess calories without serious comorbidity with body mass index (BMI) of 33.0 to 33.9 in adult  Patient is tolerating compounded semaglutide at 1.7 mg.  However he has not really lost substantial amount more weight.  I suggest we increase it to 0.4 mg.  The patient is agreeable.    We had a long discussion about the continued availability of compounded semaglutide.  For now my understanding is that therapy will be able to continue to compound at least through the end of April.  After that we will have to see what happens.    We can revisit the subject if and when indicated either by availability of compounded semaglutide or patient's response or lack thereof.    We are both aware of the lowly direct program which would be another possibility.  This would be a vial of Zepbound.  I am not 100% clear on whether this will be continue to offer by [employer] [facility] long-term or not.  - COMPOUNDED NON-CONTROLLED SUBSTANCE (CMPD RX) - PHARMACY TO MIX COMPOUNDED MEDICATION  Dispense: 4 each; Refill: 5    Moderate persistent asthma without complication  Patient request refill.  For some reason he is only able to get 30 pills at a time.  Long-term prescription is sent with multiple refills.  - montelukast (SINGULAIR) 10 MG tablet  Dispense: 90 tablet; Refill: 11    Mild persistent asthma without complication  Rare use.  Rescue inhaler only.  Refill sent.  - albuterol (PROAIR HFA/PROVENTIL HFA/VENTOLIN HFA) 108 (90 Base) MCG/ACT inhaler  Dispense: 18 g; Refill: 4    Peanut allergy  Added to allergy list.  EpiPen sent.  - EPINEPHrine (ANY BX GENERIC EQUIV) 0.3 MG/0.3ML injection 2-pack  Dispense: 2 each; Refill:  "0        The longitudinal plan of care for the diagnosis(es)/condition(s) as documented were addressed during this visit. Due to the added complexity in care, I will continue to support Geo in the subsequent management and with ongoing continuity of care.  BMI  Estimated body mass index is 32.5 kg/m  as calculated from the following:    Height as of 11/19/24: 1.905 m (6' 3\").    Weight as of 11/19/24: 117.9 kg (260 lb).         Subjective   Geo is a 54 year old, presenting for the following health issues:  Medication Follow-up, RECHECK (Dx for Cytopenia ), and Medication Request (Epi pen, Singulair, and Albuterol )    This is a follow-up from our November 19 visit.  Patient with class I obesity.  After our last visit we agreed to increase to 1.7 mg on semaglutide.  He is getting compounded semaglutide.  He feels he's down 12-13lbs since August.  Has stalled out for some time now.      He notes no ill Side effects except for some intermittent constipation.      History of Present Illness       Reason for visit:  GLP-1 touch base.   Also need a couple refills.  Also not sure why I am seeing a diagnosis for Cytopenia in my chart.    He eats 2-3 servings of fruits and vegetables daily.He consumes 0 sweetened beverage(s) daily.He exercises with enough effort to increase his heart rate 20 to 29 minutes per day.  He exercises with enough effort to increase his heart rate 3 or less days per week.   He is taking medications regularly.                Objective    Vitals - Patient Reported  Pain Score: No Pain (0)      Vitals:  No vitals were obtained today due to virtual visit.    Wt Readings from Last 3 Encounters:   11/19/24 117.9 kg (260 lb)   11/08/23 119.7 kg (264 lb)   09/19/23 118.8 kg (262 lb)     Current weight 250    Physical Exam   GENERAL: alert and no distress  EYES: Eyes grossly normal to inspection.  No discharge or erythema, or obvious scleral/conjunctival abnormalities.  RESP: No audible wheeze, cough, or " visible cyanosis.    SKIN: Visible skin clear. No significant rash, abnormal pigmentation or lesions.  NEURO: Cranial nerves grossly intact.  Mentation and speech appropriate for age.  PSYCH: Appropriate affect, tone, and pace of words          Video-Visit Details    Type of service:  Video Visit   Originating Location (pt. Location): Home    Distant Location (provider location):  On-site  Platform used for Video Visit: Jose Alejandro  Signed Electronically by: Femi Villar MD

## 2025-04-21 ENCOUNTER — VIRTUAL VISIT (OUTPATIENT)
Dept: FAMILY MEDICINE | Facility: CLINIC | Age: 55
End: 2025-04-21
Payer: COMMERCIAL

## 2025-04-21 DIAGNOSIS — E66.811 CLASS 1 OBESITY DUE TO EXCESS CALORIES WITHOUT SERIOUS COMORBIDITY WITH BODY MASS INDEX (BMI) OF 33.0 TO 33.9 IN ADULT: Primary | ICD-10-CM

## 2025-04-21 DIAGNOSIS — E66.09 CLASS 1 OBESITY DUE TO EXCESS CALORIES WITHOUT SERIOUS COMORBIDITY WITH BODY MASS INDEX (BMI) OF 33.0 TO 33.9 IN ADULT: Primary | ICD-10-CM

## 2025-04-21 PROCEDURE — 98005 SYNCH AUDIO-VIDEO EST LOW 20: CPT | Performed by: INTERNAL MEDICINE

## 2025-04-21 NOTE — PROGRESS NOTES
"Geo is a 54 year old who is being evaluated via a billable video visit.    How would you like to obtain your AVS? MyChart  If the video visit is dropped, the invitation should be resent by: Text to cell phone: [phone]  Will anyone else be joining your video visit? No      Assessment & Plan     Class 1 obesity due to excess calories without serious comorbidity with body mass index (BMI) of 33.0 to 33.9 in adult  We had a good discussion about our treatment options.  1 option would be sublingual semaglutide which would still be compounded through our [place] compounding pharmacy.  Another option would be semaglutide directly from the .  Third option would be Zepbound directly from the .  After careful consideration the patient would like to proceed with Zepbound directly from the .    We will start at 5 mg/week.  Patient is advised to return to clinic in 3 months.  This can be done virtually.  Risks and side effects of medication discussed.      - tirzepatide-weight management (ZEPBOUND) 5 MG/0.5ML vial  Dispense: 2 mL; Refill: 2    The longitudinal plan of care for the diagnosis(es)/condition(s) as documented were addressed during this visit. Due to the added complexity in care, I will continue to support Geo in the subsequent management and with ongoing continuity of care.    BMI  Estimated body mass index is 32.5 kg/m  as calculated from the following:    Height as of 11/19/24: 1.905 m (6' 3\").    Weight as of 11/19/24: 117.9 kg (260 lb).         Subjective   Geo is a 54 year old, presenting for the following health issues:  Medication Follow-up (new GLP-1 medication)        4/21/2025    11:51 AM   Additional Questions   Roomed by Maricarmen     Patient with obesity.  He is currently on compounded semaglutide.  Last month we increased the dose of 2.4 mg.  His weight in clinic back in November was 260 pounds.  The patient reports a weight of 247 right now    He does describe " "mild GI disturbance 24 hours after injection.  Gets constipation but mitigated with Miralax.      He has not had robust weight loss but is not overly discouraged by this either.    As we understand compounded semaglutide will be discontinued.  Patient is here to discuss options.    History of Present Illness       Reason for visit:  Discusss change from compunded semaglutin to different medication    He eats 2-3 servings of fruits and vegetables daily.He consumes 0 sweetened beverage(s) daily.He exercises with enough effort to increase his heart rate 10 to 19 minutes per day.  He exercises with enough effort to increase his heart rate 3 or less days per week.   He is taking medications regularly.                    Objective    Vitals - Patient Reported  Weight (Patient Reported): 112 kg (247 lb)  Height (Patient Reported): 190.5 cm (6' 3\")  BMI (Based on Pt Reported Ht/Wt): 30.87        Physical Exam   GENERAL: alert and no distress  EYES: Eyes grossly normal to inspection.  No discharge or erythema, or obvious scleral/conjunctival abnormalities.  RESP: No audible wheeze, cough, or visible cyanosis.    SKIN: Visible skin clear. No significant rash, abnormal pigmentation or lesions.  NEURO: Cranial nerves grossly intact.  Mentation and speech appropriate for age.  PSYCH: Appropriate affect, tone, and pace of words          Video-Visit Details    Type of service:  Video Visit   Originating Location (pt. Location): Home    Distant Location (provider location):  On-site  Platform used for Video Visit: Jose Alejandro  Signed Electronically by: Femi Villar MD    "

## 2025-05-17 ENCOUNTER — TRANSFERRED RECORDS (OUTPATIENT)
Dept: HEALTH INFORMATION MANAGEMENT | Facility: CLINIC | Age: 55
End: 2025-05-17
Payer: COMMERCIAL

## 2025-06-23 ENCOUNTER — MYC MEDICAL ADVICE (OUTPATIENT)
Dept: FAMILY MEDICINE | Facility: CLINIC | Age: 55
End: 2025-06-23
Payer: COMMERCIAL

## 2025-06-23 DIAGNOSIS — E66.811 CLASS 1 OBESITY DUE TO EXCESS CALORIES WITHOUT SERIOUS COMORBIDITY WITH BODY MASS INDEX (BMI) OF 33.0 TO 33.9 IN ADULT: ICD-10-CM

## 2025-06-23 DIAGNOSIS — E66.09 CLASS 1 OBESITY DUE TO EXCESS CALORIES WITHOUT SERIOUS COMORBIDITY WITH BODY MASS INDEX (BMI) OF 33.0 TO 33.9 IN ADULT: ICD-10-CM

## 2025-06-23 NOTE — TELEPHONE ENCOUNTER
Dr. Villar,    Patient is requesting to increase dose for Zepbound, states he does not feel any benefit yet. Please see patient MC.     Pharmacy and medication pended if appropriate.

## 2025-07-20 ENCOUNTER — MYC MEDICAL ADVICE (OUTPATIENT)
Dept: FAMILY MEDICINE | Facility: CLINIC | Age: 55
End: 2025-07-20
Payer: COMMERCIAL

## 2025-07-20 DIAGNOSIS — E66.09 CLASS 1 OBESITY DUE TO EXCESS CALORIES WITHOUT SERIOUS COMORBIDITY WITH BODY MASS INDEX (BMI) OF 33.0 TO 33.9 IN ADULT: Primary | ICD-10-CM

## 2025-07-20 DIAGNOSIS — E66.811 CLASS 1 OBESITY DUE TO EXCESS CALORIES WITHOUT SERIOUS COMORBIDITY WITH BODY MASS INDEX (BMI) OF 33.0 TO 33.9 IN ADULT: Primary | ICD-10-CM

## 2025-07-21 RX ORDER — TIRZEPATIDE 12.5 MG/.5ML
12.5 INJECTION, SOLUTION SUBCUTANEOUS WEEKLY
Qty: 2 ML | Refills: 3 | Status: SHIPPED | OUTPATIENT
Start: 2025-07-21

## 2025-07-21 NOTE — TELEPHONE ENCOUNTER
Dr. Villar,     Pt wanting to move up from 10 mg to 12.5 mg of Zepbound. States he has had so side effects with current dose. I do not see a vial option for 12.5 please advise, pharmacy pended.     Libertad Loo RN on 7/21/2025 at 11:23 AM

## 2025-07-22 NOTE — TELEPHONE ENCOUNTER
Attempted to call pharmacy. Left telephone number for callback. Upon callback, please determine if rx 12.5mg has been received.

## 2025-07-23 NOTE — TELEPHONE ENCOUNTER
Dr. Villar,     Please see pt  message and advise on next steps for Zepbound dosing.     Libertad Loo RN on 7/23/2025 at 9:39 AM

## (undated) RX ORDER — FENTANYL CITRATE 50 UG/ML
INJECTION, SOLUTION INTRAMUSCULAR; INTRAVENOUS
Status: DISPENSED
Start: 2022-01-27